# Patient Record
Sex: FEMALE | Race: OTHER | Employment: FULL TIME | URBAN - METROPOLITAN AREA
[De-identification: names, ages, dates, MRNs, and addresses within clinical notes are randomized per-mention and may not be internally consistent; named-entity substitution may affect disease eponyms.]

---

## 2017-03-30 ENCOUNTER — HOSPITAL ENCOUNTER (OUTPATIENT)
Dept: ULTRASOUND IMAGING | Facility: HOSPITAL | Age: 57
Discharge: HOME | End: 2017-03-30
Attending: OTOLARYNGOLOGY
Payer: COMMERCIAL

## 2017-03-30 DIAGNOSIS — E04.1 THYROID NODULE: ICD-10-CM

## 2017-03-30 PROCEDURE — 76536 US EXAM OF HEAD AND NECK: CPT

## 2017-04-03 ENCOUNTER — TELEPHONE (OUTPATIENT)
Dept: OTOLARYNGOLOGY | Facility: CLINIC | Age: 57
End: 2017-04-03

## 2017-04-03 NOTE — TELEPHONE ENCOUNTER
Notes Recorded by Flex Parker RN on 4/3/2017 at 3:35 PM  [address] , [place] [id number]    Pt informed of results and recommendation to RTC in 6 mos.  Pt verbalized understanding and will c/b to schedule appt in 6 mos.

## 2017-04-09 ENCOUNTER — HOSPITAL ENCOUNTER (OUTPATIENT)
Dept: MAMMOGRAPHY | Facility: HOSPITAL | Age: 57
Discharge: HOME | End: 2017-04-09
Attending: OBSTETRICS & GYNECOLOGY
Payer: COMMERCIAL

## 2017-04-09 DIAGNOSIS — Z12.31 ENCOUNTER FOR SCREENING MAMMOGRAM FOR BREAST CANCER: ICD-10-CM

## 2017-04-09 PROCEDURE — 77067 SCR MAMMO BI INCL CAD: CPT

## 2017-05-04 ENCOUNTER — TELEPHONE (OUTPATIENT)
Dept: OTOLARYNGOLOGY | Facility: CLINIC | Age: 57
End: 2017-05-04

## 2017-05-04 NOTE — TELEPHONE ENCOUNTER
Notes Recorded by Kimberly Rios RN on 4/3/2017 at 3:35 PM  [address] , [place] [id number]    Pt informed of results and recommendation to RTC in 6 mos.  Pt verbalized understanding and will c/b to schedule appt in 6 mos.   Notes Recorded

## 2017-10-13 ENCOUNTER — TELEPHONE (OUTPATIENT)
Dept: OTOLARYNGOLOGY | Facility: CLINIC | Age: 57
End: 2017-10-13

## 2017-10-13 NOTE — TELEPHONE ENCOUNTER
----- Message from Genie Barone RN sent at 5/4/2017  4:10 PM CDT -----  Regarding: F/U appt per JDO  Notes Recorded by Fatou Hikcs RN on 4/3/2017 at 3:35 PM  [address] , [place] [id number]    Pt informed of results and recommendation

## 2017-10-26 ENCOUNTER — OFFICE VISIT (OUTPATIENT)
Dept: OTOLARYNGOLOGY | Facility: CLINIC | Age: 57
End: 2017-10-26

## 2017-10-26 VITALS
HEART RATE: 60 BPM | SYSTOLIC BLOOD PRESSURE: 120 MMHG | DIASTOLIC BLOOD PRESSURE: 74 MMHG | BODY MASS INDEX: 24.99 KG/M2 | HEIGHT: 65 IN | WEIGHT: 150 LBS

## 2017-10-26 DIAGNOSIS — R09.82 POSTNASAL DISCHARGE: ICD-10-CM

## 2017-10-26 DIAGNOSIS — E04.1 THYROID NODULE: Primary | ICD-10-CM

## 2017-10-26 PROCEDURE — 99214 OFFICE O/P EST MOD 30 MIN: CPT | Performed by: OTOLARYNGOLOGY

## 2017-10-26 PROCEDURE — 99212 OFFICE O/P EST SF 10 MIN: CPT | Performed by: OTOLARYNGOLOGY

## 2017-10-27 NOTE — PROGRESS NOTES
Demond Lea is a 61year old female. Patient presents with: Follow - Up: Here today for Follow Up on Thyroid US Results       HISTORY OF PRESENT ILLNESS  Previous thyroidectomy.  Recent US unremarkable except for a small cyst. She presents with complaint Heart Disease Father      CAD   • Hypertension Other 79       Past Medical History:   Diagnosis Date   • Cyst, dermoid, arm 2003    per Next[employer]: removed   • H/O breast augmentation     per [employer]:    • Lipid screening 7/27/2013    per NG       Past Surgic Normal.   Ears Normal Inspection - Right: Normal, Left: Normal. Canal - Right: Normal, Left: Normal. TM - Right: Normal, Left: Normal.   Skin Normal Inspection - Normal.        Lymph Detail Normal Submental. Submandibular.  Anterior cervical. Posterior cerv

## 2018-01-25 ENCOUNTER — OFFICE VISIT (OUTPATIENT)
Dept: OBGYN CLINIC | Facility: CLINIC | Age: 58
End: 2018-01-25

## 2018-01-25 VITALS
HEIGHT: 66 IN | SYSTOLIC BLOOD PRESSURE: 128 MMHG | HEART RATE: 67 BPM | DIASTOLIC BLOOD PRESSURE: 82 MMHG | BODY MASS INDEX: 24.85 KG/M2 | WEIGHT: 154.63 LBS

## 2018-01-25 DIAGNOSIS — Z12.31 ENCOUNTER FOR SCREENING MAMMOGRAM FOR BREAST CANCER: ICD-10-CM

## 2018-01-25 DIAGNOSIS — Z01.419 ENCOUNTER FOR WELL WOMAN EXAM WITH ROUTINE GYNECOLOGICAL EXAM: Primary | ICD-10-CM

## 2018-01-25 PROCEDURE — 99396 PREV VISIT EST AGE 40-64: CPT | Performed by: OBSTETRICS & GYNECOLOGY

## 2018-01-25 NOTE — PROGRESS NOTES
HPI:    Patient ID: Heath Mukherjee is a 61year old female. HPI  Well woman visit  No complaints. Sexually active. Normal urination and bowel movements. Had previous colonoscopy which is normal.  Has breast implants.   Has always had some lateral right behavior is normal. Judgment normal.  Able to communicate verbally. HEENT:  EOMI. ELLIE. Sclera anicteric. Head: Normocephalic. Normal hair distribution. No lesions. Neck: Normal range of motion.       Adenopathy:  No supraclavicular or cervical diagnosis)  Encounter for screening mammogram for breast cancer  Normal exam.   Monthly self breast exam.  Annual screening mammograms. Recommend screening colonoscopy at age 47, or as advised by GI.   Recommend dexascan for osteoporosis screening as indic

## 2018-01-26 ENCOUNTER — TELEPHONE (OUTPATIENT)
Dept: OBGYN CLINIC | Facility: CLINIC | Age: 58
End: 2018-01-26

## 2018-01-26 NOTE — TELEPHONE ENCOUNTER
Pt states has been having pain in her uterus, rates it from 4-5 today. Pt states yesterday was 10. Has been taking advil for pain. Pt had a annual yesterday with Dr. Gonzales Hughes would like to know why pelvic exam caused so much pain. Pls advise.

## 2018-01-26 NOTE — TELEPHONE ENCOUNTER
Pt had annual yesterday with Ti, states that she is having uterus pain. Unable to sleep from pain. Took tynol extra strength, worked for a bit. Went to work and took advil.

## 2018-01-27 NOTE — TELEPHONE ENCOUNTER
fyi  Per pt after her pelvic exam with AJb she began experiencing lower abdominal pain, more on her left. Worse when bladder is full rated 4-7/10. Feels like ovary pain per pt.  Better with rest.  Finds it odd since she has never had this pain with pelvic

## 2018-01-29 ENCOUNTER — APPOINTMENT (OUTPATIENT)
Dept: GENERAL RADIOLOGY | Age: 58
End: 2018-01-29
Attending: FAMILY MEDICINE
Payer: COMMERCIAL

## 2018-01-29 ENCOUNTER — HOSPITAL ENCOUNTER (OUTPATIENT)
Age: 58
Discharge: HOME | End: 2018-01-29
Attending: FAMILY MEDICINE
Payer: COMMERCIAL

## 2018-01-29 VITALS
BODY MASS INDEX: 30 KG/M2 | SYSTOLIC BLOOD PRESSURE: 128 MMHG | HEART RATE: 72 BPM | DIASTOLIC BLOOD PRESSURE: 90 MMHG | WEIGHT: 184 LBS | RESPIRATION RATE: 20 BRPM | OXYGEN SATURATION: 97 % | TEMPERATURE: 99 F

## 2018-01-29 DIAGNOSIS — J40 BRONCHITIS: Primary | ICD-10-CM

## 2018-01-29 PROCEDURE — 99213 OFFICE O/P EST LOW 20 MIN: CPT

## 2018-01-29 PROCEDURE — 71046 X-RAY EXAM CHEST 2 VIEWS: CPT | Performed by: FAMILY MEDICINE

## 2018-01-29 PROCEDURE — 99204 OFFICE O/P NEW MOD 45 MIN: CPT

## 2018-01-29 RX ORDER — ALBUTEROL SULFATE 90 UG/1
2 AEROSOL, METERED RESPIRATORY (INHALATION) EVERY 4 HOURS PRN
Qty: 1 INHALER | Refills: 0 | Status: SHIPPED | OUTPATIENT
Start: 2018-01-29 | End: 2018-02-28

## 2018-01-29 NOTE — ED NOTES
Patient declines EKG for chest pain. Patient states she thinks this is respiratory and not cardiac. Pt states she has had many cardiac tests with normal findings.

## 2018-01-29 NOTE — ED PROVIDER NOTES
Patient Seen in: [facility] In [address] [place] [address]    History   Patient presents with:  Cough/URI    Stated Complaint: coughing spasms    HPI    Pt is a 60 yo with a 3 day h/o nasal congestion and cough. No fevers, no wheezing.  Occasionally sh normal and breath sounds normal. No respiratory distress. She has no wheezes. Neurological: She is alert and oriented to person, place, and time. Skin: Skin is warm. Psychiatric: She has a normal mood and affect.  Her behavior is normal.   Nursing not

## 2018-01-30 ENCOUNTER — OFFICE VISIT (OUTPATIENT)
Dept: OBGYN CLINIC | Facility: CLINIC | Age: 58
End: 2018-01-30

## 2018-01-30 VITALS — HEART RATE: 71 BPM | DIASTOLIC BLOOD PRESSURE: 73 MMHG | SYSTOLIC BLOOD PRESSURE: 120 MMHG

## 2018-01-30 DIAGNOSIS — R10.2 VAGINAL PAIN: Primary | ICD-10-CM

## 2018-01-30 PROCEDURE — 99212 OFFICE O/P EST SF 10 MIN: CPT | Performed by: OBSTETRICS & GYNECOLOGY

## 2018-02-01 PROBLEM — R10.2 VAGINAL PAIN: Status: ACTIVE | Noted: 2018-02-01

## 2018-02-01 NOTE — PROGRESS NOTES
HPI:    Patient ID: Anna Graves is a 61year old female. HPI  GYN problem visit  Complains of having vaginal pain and discomfort after her recent pelvic examination. Says that it is essentially gone now. She denies any vaginal discharge.   Full urine

## 2018-02-08 ENCOUNTER — OFFICE VISIT (OUTPATIENT)
Dept: OTOLARYNGOLOGY | Facility: CLINIC | Age: 58
End: 2018-02-08

## 2018-02-08 VITALS
SYSTOLIC BLOOD PRESSURE: 112 MMHG | DIASTOLIC BLOOD PRESSURE: 70 MMHG | WEIGHT: 148 LBS | HEIGHT: 66 IN | BODY MASS INDEX: 23.78 KG/M2 | TEMPERATURE: 98 F

## 2018-02-08 DIAGNOSIS — E04.1 THYROID NODULE: ICD-10-CM

## 2018-02-08 DIAGNOSIS — R07.0 THROAT DISCOMFORT: Primary | ICD-10-CM

## 2018-02-08 PROCEDURE — 99214 OFFICE O/P EST MOD 30 MIN: CPT | Performed by: OTOLARYNGOLOGY

## 2018-02-08 PROCEDURE — 99212 OFFICE O/P EST SF 10 MIN: CPT | Performed by: OTOLARYNGOLOGY

## 2018-02-08 RX ORDER — OMEPRAZOLE 40 MG/1
40 CAPSULE, DELAYED RELEASE ORAL DAILY
Qty: 30 CAPSULE | Refills: 2 | Status: SHIPPED | OUTPATIENT
Start: 2018-02-08 | End: 2021-02-13 | Stop reason: ALTCHOICE

## 2018-02-08 RX ORDER — LORATADINE 10 MG/1
10 TABLET ORAL DAILY
Qty: 30 TABLET | Refills: 3 | Status: SHIPPED | OUTPATIENT
Start: 2018-02-08 | End: 2020-07-06

## 2018-02-08 RX ORDER — MONTELUKAST SODIUM 10 MG/1
10 TABLET ORAL NIGHTLY
Qty: 30 TABLET | Refills: 3 | Status: SHIPPED | OUTPATIENT
Start: 2018-02-08 | End: 2020-07-06

## 2018-02-08 RX ORDER — FLUTICASONE PROPIONATE 50 MCG
1 SPRAY, SUSPENSION (ML) NASAL 2 TIMES DAILY
Qty: 1 BOTTLE | Refills: 3 | Status: SHIPPED | OUTPATIENT
Start: 2018-02-08 | End: 2020-07-06

## 2018-02-08 NOTE — PROGRESS NOTES
Madhav Cadet is a 61year old female. Patient presents with: Follow - Up: thyroid nodule- LOV 10/26/17      HISTORY OF PRESENT ILLNESS  Previous thyroidectomy. Recent US unremarkable except for a small cyst. She presents with complaint of halitosis.  Prev education:                 Number of children:               Social History Main Topics    Smoking status: Never Smoker                                                                Smokeless tobacco: Never Used                        Alcohol use:  Yes Normal. Palpation - Normal. Parotid gland - Normal. Thyroid gland - Normal.   Eyes Normal Conjunctiva - Right: Normal, Left: Normal. Pupil - Right: Normal, Left: Normal. Fundus - Right: Normal, Left: Normal.   Neurological Normal Memory - Normal. Cranial n month.    2. Thyroid nodule  No new palpable nodule noted on the remaining right hemithyroid. No need for ultrasound at this time. Routine thyroid screenings on exam.            Cassie Lovelace MD    2/8/2018    4:19 PM

## 2018-03-05 ENCOUNTER — HOSPITAL ENCOUNTER (OUTPATIENT)
Dept: MAMMOGRAPHY | Facility: HOSPITAL | Age: 58
Discharge: HOME | End: 2018-03-05
Attending: OBSTETRICS & GYNECOLOGY
Payer: COMMERCIAL

## 2018-03-05 DIAGNOSIS — Z12.31 ENCOUNTER FOR SCREENING MAMMOGRAM FOR BREAST CANCER: ICD-10-CM

## 2018-03-05 PROCEDURE — 77067 SCR MAMMO BI INCL CAD: CPT | Performed by: OBSTETRICS & GYNECOLOGY

## 2018-03-08 ENCOUNTER — TELEPHONE (OUTPATIENT)
Dept: OBGYN CLINIC | Facility: CLINIC | Age: 58
End: 2018-03-08

## 2018-03-08 NOTE — TELEPHONE ENCOUNTER
Phone line  #[phone] used to translate phone call. Pt informed of Dr. Vences Siemens message below.  Pt voices understanding and voices she will call centralized scheduling to schedule additional views and U/S of left breast.

## 2018-03-08 NOTE — TELEPHONE ENCOUNTER
----- Message from Christina Mansfield MD sent at 3/6/2018  4:28 AM CST -----  Please inform that her mammogram is incomplete and needs additional views of left breast and left breast ultrasound.

## 2018-03-09 ENCOUNTER — TELEPHONE (OUTPATIENT)
Dept: OBGYN CLINIC | Facility: CLINIC | Age: 58
End: 2018-03-09

## 2018-03-09 NOTE — TELEPHONE ENCOUNTER
Pt asking if additional US/ mammogram should be for left? States having some discomfort on R breast rather than L. Pt also stated she is still having vaginal pain, asking for US order. Kazakh speaking. pls adv.

## 2018-03-10 NOTE — TELEPHONE ENCOUNTER
Per pt she after annual exam at the end of January she began feeling uncomfortable feeling in pelvic area near ovaries. Pain is from 5-8/10. Currently a 6. Worse when bladder is full. Denied urgency, frequency, or burning/ painful urination.   Stated it fee

## 2018-03-13 ENCOUNTER — HOSPITAL ENCOUNTER (OUTPATIENT)
Dept: MAMMOGRAPHY | Facility: HOSPITAL | Age: 58
Discharge: HOME | End: 2018-03-13
Attending: OBSTETRICS & GYNECOLOGY
Payer: COMMERCIAL

## 2018-03-13 DIAGNOSIS — R92.8 ABNORMAL MAMMOGRAM: ICD-10-CM

## 2018-03-13 PROCEDURE — 77061 BREAST TOMOSYNTHESIS UNI: CPT | Performed by: OBSTETRICS & GYNECOLOGY

## 2018-03-13 PROCEDURE — 77065 DX MAMMO INCL CAD UNI: CPT | Performed by: OBSTETRICS & GYNECOLOGY

## 2018-04-28 ENCOUNTER — APPOINTMENT (OUTPATIENT)
Dept: GENERAL RADIOLOGY | Facility: HOSPITAL | Age: 58
End: 2018-04-28
Attending: EMERGENCY MEDICINE
Payer: COMMERCIAL

## 2018-04-28 ENCOUNTER — HOSPITAL ENCOUNTER (EMERGENCY)
Facility: HOSPITAL | Age: 58
Discharge: HOME | End: 2018-04-28
Attending: EMERGENCY MEDICINE
Payer: COMMERCIAL

## 2018-04-28 VITALS
DIASTOLIC BLOOD PRESSURE: 67 MMHG | OXYGEN SATURATION: 99 % | TEMPERATURE: 99 F | HEART RATE: 68 BPM | RESPIRATION RATE: 20 BRPM | SYSTOLIC BLOOD PRESSURE: 140 MMHG

## 2018-04-28 DIAGNOSIS — S52.125A CLOSED NONDISPLACED FRACTURE OF HEAD OF LEFT RADIUS, INITIAL ENCOUNTER: Primary | ICD-10-CM

## 2018-04-28 PROCEDURE — 99284 EMERGENCY DEPT VISIT MOD MDM: CPT

## 2018-04-28 PROCEDURE — 29105 APPLICATION LONG ARM SPLINT: CPT

## 2018-04-28 PROCEDURE — 73080 X-RAY EXAM OF ELBOW: CPT | Performed by: EMERGENCY MEDICINE

## 2018-04-29 NOTE — ED PROVIDER NOTES
Patient Seen in: [facility] Emergency Department    History   Patient presents with:  Upper Extremity Injury (musculoskeletal)      HPI    Patient presents to the ED complaining of left elbow pain after slipping her kitchen and falling onto her el ED Triage Vitals [04/28/18 1840]  BP: 140/67  Pulse: 68  Resp: 20  Temp: 99.3 °F (37.4 °C)  Temp src: Oral  SpO2: 99 %  O2 Device: None (Room air)    Physical Exam   Constitutional: She is oriented to person, place, and time.  She appears well-developed list. to contribute to the complexity of this ED evaluation. ED Course: Patient presents to the ED after a mechanical fall. Left elbow injury, no other complaints. Evidence for a radial head fracture on x-ray evaluation.   Patient placed in a long-arm

## 2018-12-18 ENCOUNTER — TELEPHONE (OUTPATIENT)
Dept: OTOLARYNGOLOGY | Facility: CLINIC | Age: 58
End: 2018-12-18

## 2018-12-18 NOTE — TELEPHONE ENCOUNTER
Pt. wants to know if  can send a new Rx for Calium 600mg + D 800 units Tablets. Pt. States that she takes 1 pill daily - 90 day supply.

## 2018-12-18 NOTE — TELEPHONE ENCOUNTER
Dr. Salgado Part please see note below , pt had left hemithyroidectomy in 2014. Pt last seen in 02/08/18 for throat discomfort and thyroid nodule follow up. Pt asking of calcium and vitamin D rx?   Please advise

## 2019-01-10 ENCOUNTER — OFFICE VISIT (OUTPATIENT)
Dept: OTOLARYNGOLOGY | Facility: CLINIC | Age: 59
End: 2019-01-10
Payer: COMMERCIAL

## 2019-01-10 VITALS
SYSTOLIC BLOOD PRESSURE: 123 MMHG | BODY MASS INDEX: 23.3 KG/M2 | DIASTOLIC BLOOD PRESSURE: 78 MMHG | HEIGHT: 66 IN | TEMPERATURE: 98 F | WEIGHT: 145 LBS

## 2019-01-10 DIAGNOSIS — E04.1 THYROID NODULE: Primary | ICD-10-CM

## 2019-01-10 DIAGNOSIS — Z00.00 PHYSICAL EXAM: ICD-10-CM

## 2019-01-10 PROCEDURE — 99212 OFFICE O/P EST SF 10 MIN: CPT | Performed by: OTOLARYNGOLOGY

## 2019-01-10 PROCEDURE — 99214 OFFICE O/P EST MOD 30 MIN: CPT | Performed by: OTOLARYNGOLOGY

## 2019-01-10 NOTE — PROGRESS NOTES
Sarah Ospina is a 60year old female. Patient presents with: Other: pt here for a check up on thyroid       HISTORY OF PRESENT ILLNESS  Previous thyroidectomy. Recent US unremarkable except for a small cyst. She presents with complaint of halitosis.  Prev months ago. no other significant complaints or concerns other than a sensation of phlegm in the back of her throat.       Social History    Socioeconomic History      Marital status:       Spouse name: Not on file      Number of children: Not on f °C) (Tympanic)   Ht 5' 6\" (1.676 m)   Wt 145 lb (65.8 kg)   BMI 23.40 kg/m²        Constitutional Normal Overall appearance - Normal.   Psychiatric Normal Orientation - Oriented to time, place, person & situation. Appropriate mood and affect.    Neck Exam US THYROID (JTM=[id number]); Future    2. Physical exam  I did refer her to Dr. Sindy García for routine medical care. We will go ahead and order ultrasound of thyroid to look for interval change in her 4 mm nodule.   I will call her with the results of her

## 2019-04-18 ENCOUNTER — TELEPHONE (OUTPATIENT)
Dept: OTOLARYNGOLOGY | Facility: CLINIC | Age: 59
End: 2019-04-18

## 2019-04-18 NOTE — TELEPHONE ENCOUNTER
Dr Mirna Soto, pt asking to speak directly to you, pt did not want to give anymore info, states it is regarding her throat.  Advised pt you are in clinic until 7

## 2019-04-24 ENCOUNTER — TELEPHONE (OUTPATIENT)
Dept: OTOLARYNGOLOGY | Facility: CLINIC | Age: 59
End: 2019-04-24

## 2019-04-24 NOTE — TELEPHONE ENCOUNTER
Pt is requesting to speak to [facility] on Thurs. 4/25/19, anytime after 3:00pm, regarding possible infection in throat area.

## 2019-04-24 NOTE — TELEPHONE ENCOUNTER
LMTCB- please advise RAJI is out of office today, will be in [address] [place] [address] but cannot advise when Jens Kirk will be able to call her

## 2019-04-27 NOTE — TELEPHONE ENCOUNTER
Please contact her and see what is bothering her . Perhaps this can be simply addressed by speaking with her on the phone.

## 2019-05-04 ENCOUNTER — OFFICE VISIT (OUTPATIENT)
Dept: OTOLARYNGOLOGY | Facility: CLINIC | Age: 59
End: 2019-05-04
Payer: COMMERCIAL

## 2019-05-04 VITALS — WEIGHT: 150 LBS | BODY MASS INDEX: 24.11 KG/M2 | TEMPERATURE: 98 F | HEIGHT: 66 IN

## 2019-05-04 DIAGNOSIS — R19.6 HALITOSIS: Primary | ICD-10-CM

## 2019-05-04 PROCEDURE — 99212 OFFICE O/P EST SF 10 MIN: CPT | Performed by: OTOLARYNGOLOGY

## 2019-05-04 PROCEDURE — 99214 OFFICE O/P EST MOD 30 MIN: CPT | Performed by: OTOLARYNGOLOGY

## 2019-05-04 RX ORDER — MONTELUKAST SODIUM 10 MG/1
10 TABLET ORAL NIGHTLY
Qty: 30 TABLET | Refills: 3 | Status: SHIPPED | OUTPATIENT
Start: 2019-05-04 | End: 2020-07-06

## 2019-05-04 RX ORDER — AMOXICILLIN AND CLAVULANATE POTASSIUM 875; 125 MG/1; MG/1
1 TABLET, FILM COATED ORAL EVERY 12 HOURS
Qty: 20 TABLET | Refills: 0 | Status: SHIPPED | OUTPATIENT
Start: 2019-05-04 | End: 2020-07-06

## 2019-05-04 RX ORDER — OMEPRAZOLE 40 MG/1
40 CAPSULE, DELAYED RELEASE ORAL DAILY
Qty: 30 CAPSULE | Refills: 2 | Status: SHIPPED | OUTPATIENT
Start: 2019-05-04 | End: 2020-07-06

## 2019-05-04 RX ORDER — AZELASTINE 1 MG/ML
2 SPRAY, METERED NASAL 2 TIMES DAILY
Qty: 1 BOTTLE | Refills: 0 | Status: SHIPPED | OUTPATIENT
Start: 2019-05-04 | End: 2020-07-06

## 2019-05-04 NOTE — PROGRESS NOTES
Cesar Oconnell is a 60year old female. Patient presents with:  Throat Problem: Thyroid nodules f/u. Bad breath concerns      HISTORY OF PRESENT ILLNESS  Previous thyroidectomy.  Recent US unremarkable except for a small cyst. She presents with complaint of last scan about 8 months ago. no other significant complaints or concerns other than a sensation of phlegm in the back of her throat.     5/4/19 she presents today with complaints of halitosis.   I have treated her in the past with reflux medications and SYSTEMS    System Neg/Pos Details   Constitutional Negative Fatigue, fever and weight loss. ENMT Negative Drooling. Eyes Negative Blurred vision and vision changes. Respiratory Negative Dyspnea and wheezing.    Cardio Negative Chest pain, irregular he Medications:   •  Amoxicillin-Pot Clavulanate 875-125 MG Oral Tab, Take 1 tablet by mouth every 12 (twelve) hours. , Disp: 20 tablet, Rfl: 0  •  Montelukast Sodium 10 MG Oral Tab, Take 1 tablet (10 mg total) by mouth nightly., Disp: 30 tablet, Rfl: 3  •  Lo how she is doing. If no better I will give her the name of some other ENTs as they may be more amenable to forming tonsillectomy as she does not seem to fulfill any the indications for tonsillectomy from my standpoint. Camille Martin.  Remigio Salmon MD    5/4

## 2020-04-08 ENCOUNTER — TELEPHONE (OUTPATIENT)
Dept: OTOLARYNGOLOGY | Facility: CLINIC | Age: 60
End: 2020-04-08

## 2020-04-08 NOTE — TELEPHONE ENCOUNTER
Per pt requesting refill for vitamin D. Per pt urgently needs this, lost her prescription bottle.  Please advise

## 2020-04-08 NOTE — TELEPHONE ENCOUNTER
Advised pt that Wilson Dry does not prescribed Vitamin D, clarified that pt is not talking about claritin D. Pt states no it is vitamin D. Advised pt  will need to contact PCP.

## 2020-06-24 ENCOUNTER — TELEPHONE (OUTPATIENT)
Dept: GASTROENTEROLOGY | Facility: CLINIC | Age: 60
End: 2020-06-24

## 2020-06-24 NOTE — TELEPHONE ENCOUNTER
Forwarded to [facility]. Patient contacted. Meds/allergies reviewed.     Last Procedure, Date, LILIA Benitez 7/24/15  Last Diagnosis:  Found polyp  Recalled for (mth/yrs): 5 yrs  Sedation used previously:  IV sedation  Last Prep Used (if known):  unknow Other than chronic constipation, she has been asymptomatic from a   lower  gastrointestinal tract standpoint. She also has chronic   gastroesophageal  reflux, dyspepsia, and some ENT symptoms which may or may not be   GI  related.  Endoscopy is being perfor ulceration, erosion, stricture, ring or Bermudez's esophagus. The   Z-line was  sharp at 41 cm with a less than 1 cm in size sliding hiatal   hernia. The  stomach was entered and distended appropriately with insufflated   air.  The  mucosa of the stomach was Questionnaire Details   Referral Information   [facility] Time Out   Administrative Information   Coding Query   CE OB Hx Audit Trail   SmartForms     No SmartForms are associated with this patient.    Orders Placed      None   Medication Changes        None consists of three pieces of pink to white-tan soft tissue ranging from 0.2 to   0.4 cm in greatest dimension.  The specimen is submitted in total in cassette B.   (cp)       Kerry Ibarra M.D./[facility]               Signed:   Kerry Ibarra MD                  07/27/1

## 2020-06-25 RX ORDER — POLYETHYLENE GLYCOL 3350, SODIUM CHLORIDE, SODIUM BICARBONATE, POTASSIUM CHLORIDE 420; 11.2; 5.72; 1.48 G/4L; G/4L; G/4L; G/4L
POWDER, FOR SOLUTION ORAL
Qty: 1 BOTTLE | Refills: 0 | Status: SHIPPED | OUTPATIENT
Start: 2020-06-25 | End: 2021-02-13 | Stop reason: ALTCHOICE

## 2020-06-25 NOTE — TELEPHONE ENCOUNTER
The patient's chart has been reviewed. Okay to schedule pt for 5 year CLN recall r/t hx colon polyps with Dr. Lucero Khalil.      Advise IV Twilight or MAC sedation with split dose Colyte/TriLyte or equivalent(eRx) preparation.   -Eligible for NE: Yes  -Bruce Damon

## 2020-06-26 NOTE — TELEPHONE ENCOUNTER
Spoke with pt today, she stated she has \"no time to get scheduled right now\". I advised her to call back when she is ready to get scheduled. Pt verbally understood. Please transfer to Shelia Garcia at ext [phone] or [phone] for scheduling.

## 2020-07-06 ENCOUNTER — LAB ENCOUNTER (OUTPATIENT)
Dept: LAB | Age: 60
End: 2020-07-06
Attending: FAMILY MEDICINE
Payer: COMMERCIAL

## 2020-07-06 ENCOUNTER — OFFICE VISIT (OUTPATIENT)
Dept: FAMILY MEDICINE CLINIC | Facility: CLINIC | Age: 60
End: 2020-07-06
Payer: COMMERCIAL

## 2020-07-06 VITALS
HEIGHT: 66 IN | TEMPERATURE: 98 F | BODY MASS INDEX: 24.11 KG/M2 | SYSTOLIC BLOOD PRESSURE: 106 MMHG | HEART RATE: 60 BPM | WEIGHT: 150 LBS | DIASTOLIC BLOOD PRESSURE: 65 MMHG

## 2020-07-06 DIAGNOSIS — R82.90 FOUL SMELLING URINE: ICD-10-CM

## 2020-07-06 DIAGNOSIS — R19.6 HALITOSIS: ICD-10-CM

## 2020-07-06 DIAGNOSIS — Z76.89 ENCOUNTER TO ESTABLISH CARE: ICD-10-CM

## 2020-07-06 DIAGNOSIS — R82.998 FOAMY URINE: ICD-10-CM

## 2020-07-06 DIAGNOSIS — D12.6 TUBULAR ADENOMA OF COLON: ICD-10-CM

## 2020-07-06 DIAGNOSIS — Z76.89 ENCOUNTER TO ESTABLISH CARE: Primary | ICD-10-CM

## 2020-07-06 PROBLEM — R10.2 VAGINAL PAIN: Status: RESOLVED | Noted: 2018-02-01 | Resolved: 2020-07-06

## 2020-07-06 LAB
ALBUMIN SERPL-MCNC: 3.8 G/DL (ref 3.4–5)
ALBUMIN/GLOB SERPL: 1.5 {RATIO} (ref 1–2)
ALP LIVER SERPL-CCNC: 85 U/L (ref 46–118)
ALT SERPL-CCNC: 24 U/L (ref 13–56)
ANION GAP SERPL CALC-SCNC: 2 MMOL/L (ref 0–18)
APPEARANCE: CLEAR
AST SERPL-CCNC: 18 U/L (ref 15–37)
BASOPHILS # BLD AUTO: 0.05 X10(3) UL (ref 0–0.2)
BASOPHILS NFR BLD AUTO: 0.9 %
BILIRUB SERPL-MCNC: 0.4 MG/DL (ref 0.1–2)
BILIRUB UR QL: NEGATIVE
BILIRUBIN: NEGATIVE
BUN BLD-MCNC: 17 MG/DL (ref 7–18)
BUN/CREAT SERPL: 22.7 (ref 10–20)
CALCIUM BLD-MCNC: 9 MG/DL (ref 8.5–10.1)
CHLORIDE SERPL-SCNC: 105 MMOL/L (ref 98–112)
CLARITY UR: CLEAR
CO2 SERPL-SCNC: 32 MMOL/L (ref 21–32)
COLOR UR: YELLOW
CREAT BLD-MCNC: 0.75 MG/DL (ref 0.55–1.02)
DEPRECATED RDW RBC AUTO: 41 FL (ref 35.1–46.3)
EOSINOPHIL # BLD AUTO: 0.22 X10(3) UL (ref 0–0.7)
EOSINOPHIL NFR BLD AUTO: 3.9 %
ERYTHROCYTE [DISTWIDTH] IN BLOOD BY AUTOMATED COUNT: 13.3 % (ref 11–15)
EST. AVERAGE GLUCOSE BLD GHB EST-MCNC: 100 MG/DL (ref 68–126)
GLOBULIN PLAS-MCNC: 2.5 G/DL (ref 2.8–4.4)
GLUCOSE (URINE DIPSTICK): NEGATIVE MG/DL
GLUCOSE BLD-MCNC: 85 MG/DL (ref 70–99)
GLUCOSE UR-MCNC: NEGATIVE MG/DL
HBA1C MFR BLD HPLC: 5.1 % (ref ?–5.7)
HCT VFR BLD AUTO: 35 % (ref 35–48)
HGB BLD-MCNC: 11.9 G/DL (ref 12–16)
HGB UR QL STRIP.AUTO: NEGATIVE
IMM GRANULOCYTES # BLD AUTO: 0.01 X10(3) UL (ref 0–1)
IMM GRANULOCYTES NFR BLD: 0.2 %
KETONES (URINE DIPSTICK): NEGATIVE MG/DL
KETONES UR-MCNC: NEGATIVE MG/DL
LYMPHOCYTES # BLD AUTO: 1.95 X10(3) UL (ref 1–4)
LYMPHOCYTES NFR BLD AUTO: 34.3 %
M PROTEIN MFR SERPL ELPH: 6.3 G/DL (ref 6.4–8.2)
MCH RBC QN AUTO: 28.5 PG (ref 26–34)
MCHC RBC AUTO-ENTMCNC: 34 G/DL (ref 31–37)
MCV RBC AUTO: 83.7 FL (ref 80–100)
MONOCYTES # BLD AUTO: 0.38 X10(3) UL (ref 0.1–1)
MONOCYTES NFR BLD AUTO: 6.7 %
MULTISTIX LOT#: NORMAL NUMERIC
NEUTROPHILS # BLD AUTO: 3.08 X10 (3) UL (ref 1.5–7.7)
NEUTROPHILS # BLD AUTO: 3.08 X10(3) UL (ref 1.5–7.7)
NEUTROPHILS NFR BLD AUTO: 54 %
NITRITE UR QL STRIP.AUTO: NEGATIVE
NITRITE, URINE: NEGATIVE
OCCULT BLOOD: NEGATIVE
OSMOLALITY SERPL CALC.SUM OF ELEC: 289 MOSM/KG (ref 275–295)
PATIENT FASTING Y/N/NP: NO
PH UR: 7 [PH] (ref 5–8)
PH, URINE: 7 (ref 4.5–8)
PLATELET # BLD AUTO: 240 10(3)UL (ref 150–450)
POTASSIUM SERPL-SCNC: 4 MMOL/L (ref 3.5–5.1)
PROT UR-MCNC: NEGATIVE MG/DL
RBC # BLD AUTO: 4.18 X10(6)UL (ref 3.8–5.3)
RBC #/AREA URNS AUTO: 1 /HPF
SODIUM SERPL-SCNC: 139 MMOL/L (ref 136–145)
SP GR UR STRIP: 1.01 (ref 1–1.03)
SPECIFIC GRAVITY: 1.02 (ref 1–1.03)
URINE-COLOR: YELLOW
UROBILINOGEN UR STRIP-ACNC: <2
UROBILINOGEN,SEMI-QN: 0.2 MG/DL (ref 0–1.9)
WBC # BLD AUTO: 5.7 X10(3) UL (ref 4–11)
WBC #/AREA URNS AUTO: 27 /HPF

## 2020-07-06 PROCEDURE — 3008F BODY MASS INDEX DOCD: CPT | Performed by: FAMILY MEDICINE

## 2020-07-06 PROCEDURE — 99213 OFFICE O/P EST LOW 20 MIN: CPT | Performed by: FAMILY MEDICINE

## 2020-07-06 PROCEDURE — 3078F DIAST BP <80 MM HG: CPT | Performed by: FAMILY MEDICINE

## 2020-07-06 PROCEDURE — 80053 COMPREHEN METABOLIC PANEL: CPT

## 2020-07-06 PROCEDURE — 81002 URINALYSIS NONAUTO W/O SCOPE: CPT | Performed by: FAMILY MEDICINE

## 2020-07-06 PROCEDURE — 87086 URINE CULTURE/COLONY COUNT: CPT

## 2020-07-06 PROCEDURE — 3074F SYST BP LT 130 MM HG: CPT | Performed by: FAMILY MEDICINE

## 2020-07-06 PROCEDURE — 36415 COLL VENOUS BLD VENIPUNCTURE: CPT

## 2020-07-06 PROCEDURE — 99204 OFFICE O/P NEW MOD 45 MIN: CPT | Performed by: FAMILY MEDICINE

## 2020-07-06 PROCEDURE — 81001 URINALYSIS AUTO W/SCOPE: CPT

## 2020-07-06 PROCEDURE — 85025 COMPLETE CBC W/AUTO DIFF WBC: CPT

## 2020-07-06 PROCEDURE — 83036 HEMOGLOBIN GLYCOSYLATED A1C: CPT

## 2020-07-06 NOTE — PROGRESS NOTES
HPI:   Sarah Ospina is a 60year old female who presents for an establish care visit as well as to discuss various complaints. She reports having a history of foamy, foul-smelling urine for the past several months.   Has not tried any over-the-counter prod scanned to media tab  1960   • HYSTERECTOMY      per [employer]:    • THYROIDECTOMY Left 2014    per [employer]: left hemithyroidectomy      Family History   Problem Relation Age of Onset   • Hypertension Father    • Heart Disease Father HEMOGLOBIN A1C; Future    4. Foul smelling urine  - URINALYSIS, ROUTINE; Future  - URINE CULTURE, ROUTINE; Future    5.  Halitosis  - checking labs including liver function test.  Advised patient to start taking allergy pills and antiacids that she has prev

## 2020-07-08 RX ORDER — SULFAMETHOXAZOLE AND TRIMETHOPRIM 800; 160 MG/1; MG/1
1 TABLET ORAL 2 TIMES DAILY
Qty: 6 TABLET | Refills: 0 | Status: SHIPPED | OUTPATIENT
Start: 2020-07-08 | End: 2020-07-11

## 2020-07-30 NOTE — TELEPHONE ENCOUNTER
Left message to call back and schedule. If patient calls back please forward call to GI Schedulers. 2nd attempt to schedule patient with no success. TE Closed.

## 2020-08-27 ENCOUNTER — TELEPHONE (OUTPATIENT)
Dept: FAMILY MEDICINE CLINIC | Facility: CLINIC | Age: 60
End: 2020-08-27

## 2020-08-27 NOTE — TELEPHONE ENCOUNTER
Patient reports missed call from office few weeks ago, is not sure who called or why. Pt was advised may have been GI trying to reach for appt, pt will return call to GI.  No current notes reporting a msg left for patient with our office. (see GI TE 6/24/20

## 2020-10-15 ENCOUNTER — OFFICE VISIT (OUTPATIENT)
Dept: OCCUPATIONAL MEDICINE | Age: 60
End: 2020-10-15
Attending: FAMILY MEDICINE

## 2020-10-15 ENCOUNTER — HOSPITAL ENCOUNTER (OUTPATIENT)
Dept: GENERAL RADIOLOGY | Age: 60
Discharge: HOME | End: 2020-10-15
Attending: FAMILY MEDICINE

## 2020-10-15 DIAGNOSIS — S49.91XA RIGHT SHOULDER INJURY, INITIAL ENCOUNTER: ICD-10-CM

## 2020-10-15 DIAGNOSIS — S49.91XA RIGHT SHOULDER INJURY, INITIAL ENCOUNTER: Primary | ICD-10-CM

## 2020-10-15 PROCEDURE — 73030 X-RAY EXAM OF SHOULDER: CPT | Performed by: FAMILY MEDICINE

## 2020-10-20 ENCOUNTER — OFFICE VISIT (OUTPATIENT)
Dept: OCCUPATIONAL MEDICINE | Age: 60
End: 2020-10-20
Attending: FAMILY MEDICINE

## 2020-10-20 DIAGNOSIS — S49.91XA INJURY OF RIGHT SHOULDER, INITIAL ENCOUNTER: Primary | ICD-10-CM

## 2020-11-04 ENCOUNTER — HOSPITAL ENCOUNTER (OUTPATIENT)
Dept: MRI IMAGING | Age: 60
Discharge: HOME | End: 2020-11-04
Attending: FAMILY MEDICINE
Payer: OTHER MISCELLANEOUS

## 2020-11-04 DIAGNOSIS — S49.91XA INJURY OF RIGHT SHOULDER, INITIAL ENCOUNTER: ICD-10-CM

## 2020-11-04 PROCEDURE — 73221 MRI JOINT UPR EXTREM W/O DYE: CPT | Performed by: FAMILY MEDICINE

## 2021-01-14 ENCOUNTER — LAB ENCOUNTER (OUTPATIENT)
Dept: LAB | Facility: HOSPITAL | Age: 61
End: 2021-01-14
Attending: ORTHOPAEDIC SURGERY
Payer: OTHER MISCELLANEOUS

## 2021-01-14 ENCOUNTER — HOSPITAL ENCOUNTER (OUTPATIENT)
Dept: GENERAL RADIOLOGY | Facility: HOSPITAL | Age: 61
Discharge: HOME | End: 2021-01-14
Attending: ORTHOPAEDIC SURGERY
Payer: OTHER MISCELLANEOUS

## 2021-01-14 DIAGNOSIS — Z01.818 PRE-OP EXAM: Primary | ICD-10-CM

## 2021-01-14 DIAGNOSIS — Z01.818 PRE-OP TESTING: ICD-10-CM

## 2021-01-14 LAB
ANION GAP SERPL CALC-SCNC: 2 MMOL/L (ref 0–18)
APTT PPP: 31.2 SECONDS (ref 23.2–35.3)
BASOPHILS # BLD AUTO: 0.06 X10(3) UL (ref 0–0.2)
BASOPHILS NFR BLD AUTO: 1.2 %
BUN BLD-MCNC: 17 MG/DL (ref 7–18)
BUN/CREAT SERPL: 18.3 (ref 10–20)
CALCIUM BLD-MCNC: 9.4 MG/DL (ref 8.5–10.1)
CHLORIDE SERPL-SCNC: 109 MMOL/L (ref 98–112)
CO2 SERPL-SCNC: 29 MMOL/L (ref 21–32)
CREAT BLD-MCNC: 0.93 MG/DL
DEPRECATED RDW RBC AUTO: 40.4 FL (ref 35.1–46.3)
EOSINOPHIL # BLD AUTO: 0.23 X10(3) UL (ref 0–0.7)
EOSINOPHIL NFR BLD AUTO: 4.6 %
ERYTHROCYTE [DISTWIDTH] IN BLOOD BY AUTOMATED COUNT: 13.7 % (ref 11–15)
GLUCOSE BLD-MCNC: 97 MG/DL (ref 70–99)
HCT VFR BLD AUTO: 38.1 %
HGB BLD-MCNC: 12.6 G/DL
IMM GRANULOCYTES # BLD AUTO: 0.01 X10(3) UL (ref 0–1)
IMM GRANULOCYTES NFR BLD: 0.2 %
INR BLD: 1.03 (ref 0.9–1.2)
LYMPHOCYTES # BLD AUTO: 1.5 X10(3) UL (ref 1–4)
LYMPHOCYTES NFR BLD AUTO: 30 %
MCH RBC QN AUTO: 27.2 PG (ref 26–34)
MCHC RBC AUTO-ENTMCNC: 33.1 G/DL (ref 31–37)
MCV RBC AUTO: 82.1 FL
MONOCYTES # BLD AUTO: 0.39 X10(3) UL (ref 0.1–1)
MONOCYTES NFR BLD AUTO: 7.8 %
NEUTROPHILS # BLD AUTO: 2.81 X10 (3) UL (ref 1.5–7.7)
NEUTROPHILS # BLD AUTO: 2.81 X10(3) UL (ref 1.5–7.7)
NEUTROPHILS NFR BLD AUTO: 56.2 %
OSMOLALITY SERPL CALC.SUM OF ELEC: 291 MOSM/KG (ref 275–295)
PATIENT FASTING Y/N/NP: NO
PLATELET # BLD AUTO: 279 10(3)UL (ref 150–450)
POTASSIUM SERPL-SCNC: 3.9 MMOL/L (ref 3.5–5.1)
PROTHROMBIN TIME: 13.3 SECONDS (ref 11.8–14.5)
RBC # BLD AUTO: 4.64 X10(6)UL
SODIUM SERPL-SCNC: 140 MMOL/L (ref 136–145)
WBC # BLD AUTO: 5 X10(3) UL (ref 4–11)

## 2021-01-14 PROCEDURE — 85025 COMPLETE CBC W/AUTO DIFF WBC: CPT

## 2021-01-14 PROCEDURE — 93005 ELECTROCARDIOGRAM TRACING: CPT

## 2021-01-14 PROCEDURE — 80048 BASIC METABOLIC PNL TOTAL CA: CPT

## 2021-01-14 PROCEDURE — 71046 X-RAY EXAM CHEST 2 VIEWS: CPT | Performed by: ORTHOPAEDIC SURGERY

## 2021-01-14 PROCEDURE — 93010 ELECTROCARDIOGRAM REPORT: CPT | Performed by: ORTHOPAEDIC SURGERY

## 2021-01-14 PROCEDURE — 85610 PROTHROMBIN TIME: CPT

## 2021-01-14 PROCEDURE — 36415 COLL VENOUS BLD VENIPUNCTURE: CPT

## 2021-01-14 PROCEDURE — 85730 THROMBOPLASTIN TIME PARTIAL: CPT

## 2021-02-13 ENCOUNTER — LAB ENCOUNTER (OUTPATIENT)
Dept: LAB | Age: 61
End: 2021-02-13
Attending: ORTHOPAEDIC SURGERY
Payer: COMMERCIAL

## 2021-02-13 DIAGNOSIS — Z01.818 PREOP TESTING: ICD-10-CM

## 2021-02-13 LAB — SARS-COV-2 RNA RESP QL NAA+PROBE: NOT DETECTED

## 2021-02-13 RX ORDER — BIOTIN 1 MG
1 TABLET ORAL DAILY
COMMUNITY

## 2021-02-13 RX ORDER — IBUPROFEN 800 MG/1
800 TABLET ORAL 3 TIMES DAILY PRN
COMMUNITY

## 2021-02-16 ENCOUNTER — ANESTHESIA (OUTPATIENT)
Dept: SURGERY | Facility: HOSPITAL | Age: 61
End: 2021-02-16
Payer: OTHER MISCELLANEOUS

## 2021-02-16 ENCOUNTER — ANESTHESIA EVENT (OUTPATIENT)
Dept: SURGERY | Facility: HOSPITAL | Age: 61
End: 2021-02-16
Payer: OTHER MISCELLANEOUS

## 2021-02-16 ENCOUNTER — HOSPITAL ENCOUNTER (OUTPATIENT)
Facility: HOSPITAL | Age: 61
Setting detail: HOSPITAL OUTPATIENT SURGERY
Discharge: HOME | End: 2021-02-16
Attending: ORTHOPAEDIC SURGERY | Admitting: ORTHOPAEDIC SURGERY
Payer: OTHER MISCELLANEOUS

## 2021-02-16 VITALS
TEMPERATURE: 97 F | RESPIRATION RATE: 18 BRPM | BODY MASS INDEX: 25.49 KG/M2 | WEIGHT: 153 LBS | HEART RATE: 60 BPM | OXYGEN SATURATION: 99 % | SYSTOLIC BLOOD PRESSURE: 122 MMHG | HEIGHT: 65 IN | DIASTOLIC BLOOD PRESSURE: 69 MMHG

## 2021-02-16 DIAGNOSIS — Z01.818 PREOP TESTING: Primary | ICD-10-CM

## 2021-02-16 PROBLEM — M75.101 ROTATOR CUFF TEAR, RIGHT: Status: ACTIVE | Noted: 2021-02-16

## 2021-02-16 PROCEDURE — 0RBJ4ZZ EXCISION OF RIGHT SHOULDER JOINT, PERCUTANEOUS ENDOSCOPIC APPROACH: ICD-10-PCS | Performed by: ORTHOPAEDIC SURGERY

## 2021-02-16 PROCEDURE — 64415 NJX AA&/STRD BRCH PLXS IMG: CPT | Performed by: ORTHOPAEDIC SURGERY

## 2021-02-16 PROCEDURE — 0LQ14ZZ REPAIR RIGHT SHOULDER TENDON, PERCUTANEOUS ENDOSCOPIC APPROACH: ICD-10-PCS | Performed by: ORTHOPAEDIC SURGERY

## 2021-02-16 PROCEDURE — 3E0T3BZ INTRODUCTION OF ANESTHETIC AGENT INTO PERIPHERAL NERVES AND PLEXI, PERCUTANEOUS APPROACH: ICD-10-PCS | Performed by: ANESTHESIOLOGY

## 2021-02-16 PROCEDURE — 76942 ECHO GUIDE FOR BIOPSY: CPT | Performed by: ANESTHESIOLOGY

## 2021-02-16 PROCEDURE — 76942 ECHO GUIDE FOR BIOPSY: CPT | Performed by: ORTHOPAEDIC SURGERY

## 2021-02-16 PROCEDURE — 3E0T33Z INTRODUCTION OF ANTI-INFLAMMATORY INTO PERIPHERAL NERVES AND PLEXI, PERCUTANEOUS APPROACH: ICD-10-PCS | Performed by: ANESTHESIOLOGY

## 2021-02-16 PROCEDURE — 0RNJ4ZZ RELEASE RIGHT SHOULDER JOINT, PERCUTANEOUS ENDOSCOPIC APPROACH: ICD-10-PCS | Performed by: ORTHOPAEDIC SURGERY

## 2021-02-16 DEVICE — ANCHOR CORK METAL AR-1928SF-45: Type: IMPLANTABLE DEVICE | Site: SHOULDER | Status: FUNCTIONAL

## 2021-02-16 RX ORDER — CEFAZOLIN SODIUM/WATER 2 G/20 ML
2 SYRINGE (ML) INTRAVENOUS ONCE
Status: DISCONTINUED | OUTPATIENT
Start: 2021-02-16 | End: 2021-02-16 | Stop reason: HOSPADM

## 2021-02-16 RX ORDER — ROCURONIUM BROMIDE 10 MG/ML
INJECTION, SOLUTION INTRAVENOUS AS NEEDED
Status: DISCONTINUED | OUTPATIENT
Start: 2021-02-16 | End: 2021-02-16 | Stop reason: SURG

## 2021-02-16 RX ORDER — HYDROMORPHONE HYDROCHLORIDE 1 MG/ML
0.6 INJECTION, SOLUTION INTRAMUSCULAR; INTRAVENOUS; SUBCUTANEOUS EVERY 5 MIN PRN
Status: DISCONTINUED | OUTPATIENT
Start: 2021-02-16 | End: 2021-02-16

## 2021-02-16 RX ORDER — MORPHINE SULFATE 4 MG/ML
4 INJECTION, SOLUTION INTRAMUSCULAR; INTRAVENOUS EVERY 10 MIN PRN
Status: DISCONTINUED | OUTPATIENT
Start: 2021-02-16 | End: 2021-02-16

## 2021-02-16 RX ORDER — HYDROMORPHONE HYDROCHLORIDE 1 MG/ML
0.4 INJECTION, SOLUTION INTRAMUSCULAR; INTRAVENOUS; SUBCUTANEOUS EVERY 5 MIN PRN
Status: DISCONTINUED | OUTPATIENT
Start: 2021-02-16 | End: 2021-02-16

## 2021-02-16 RX ORDER — CEFAZOLIN SODIUM/WATER 2 G/20 ML
SYRINGE (ML) INTRAVENOUS AS NEEDED
Status: DISCONTINUED | OUTPATIENT
Start: 2021-02-16 | End: 2021-02-16 | Stop reason: SURG

## 2021-02-16 RX ORDER — ROPIVACAINE HYDROCHLORIDE 5 MG/ML
INJECTION, SOLUTION EPIDURAL; INFILTRATION; PERINEURAL
Status: COMPLETED | OUTPATIENT
Start: 2021-02-16 | End: 2021-02-16

## 2021-02-16 RX ORDER — PROCHLORPERAZINE EDISYLATE 5 MG/ML
5 INJECTION INTRAMUSCULAR; INTRAVENOUS ONCE AS NEEDED
Status: DISCONTINUED | OUTPATIENT
Start: 2021-02-16 | End: 2021-02-16

## 2021-02-16 RX ORDER — SODIUM CHLORIDE, SODIUM LACTATE, POTASSIUM CHLORIDE, CALCIUM CHLORIDE 600; 310; 30; 20 MG/100ML; MG/100ML; MG/100ML; MG/100ML
INJECTION, SOLUTION INTRAVENOUS CONTINUOUS
Status: DISCONTINUED | OUTPATIENT
Start: 2021-02-16 | End: 2021-02-16

## 2021-02-16 RX ORDER — MAGNESIUM HYDROXIDE 1200 MG/15ML
LIQUID ORAL CONTINUOUS PRN
Status: COMPLETED | OUTPATIENT
Start: 2021-02-16 | End: 2021-02-16

## 2021-02-16 RX ORDER — HALOPERIDOL 5 MG/ML
0.25 INJECTION INTRAMUSCULAR ONCE AS NEEDED
Status: DISCONTINUED | OUTPATIENT
Start: 2021-02-16 | End: 2021-02-16

## 2021-02-16 RX ORDER — MIDAZOLAM HYDROCHLORIDE 1 MG/ML
INJECTION INTRAMUSCULAR; INTRAVENOUS
Status: COMPLETED | OUTPATIENT
Start: 2021-02-16 | End: 2021-02-16

## 2021-02-16 RX ORDER — NALOXONE HYDROCHLORIDE 0.4 MG/ML
80 INJECTION, SOLUTION INTRAMUSCULAR; INTRAVENOUS; SUBCUTANEOUS AS NEEDED
Status: DISCONTINUED | OUTPATIENT
Start: 2021-02-16 | End: 2021-02-16

## 2021-02-16 RX ORDER — MORPHINE SULFATE 4 MG/ML
2 INJECTION, SOLUTION INTRAMUSCULAR; INTRAVENOUS EVERY 10 MIN PRN
Status: DISCONTINUED | OUTPATIENT
Start: 2021-02-16 | End: 2021-02-16

## 2021-02-16 RX ORDER — HYDROMORPHONE HYDROCHLORIDE 1 MG/ML
0.2 INJECTION, SOLUTION INTRAMUSCULAR; INTRAVENOUS; SUBCUTANEOUS EVERY 5 MIN PRN
Status: DISCONTINUED | OUTPATIENT
Start: 2021-02-16 | End: 2021-02-16

## 2021-02-16 RX ORDER — ONDANSETRON 2 MG/ML
4 INJECTION INTRAMUSCULAR; INTRAVENOUS ONCE AS NEEDED
Status: DISCONTINUED | OUTPATIENT
Start: 2021-02-16 | End: 2021-02-16

## 2021-02-16 RX ORDER — ACETAMINOPHEN 500 MG
1000 TABLET ORAL ONCE
Status: COMPLETED | OUTPATIENT
Start: 2021-02-16 | End: 2021-02-16

## 2021-02-16 RX ORDER — ONDANSETRON 2 MG/ML
INJECTION INTRAMUSCULAR; INTRAVENOUS AS NEEDED
Status: DISCONTINUED | OUTPATIENT
Start: 2021-02-16 | End: 2021-02-16 | Stop reason: SURG

## 2021-02-16 RX ORDER — GLYCOPYRROLATE 0.2 MG/ML
INJECTION, SOLUTION INTRAMUSCULAR; INTRAVENOUS AS NEEDED
Status: DISCONTINUED | OUTPATIENT
Start: 2021-02-16 | End: 2021-02-16 | Stop reason: SURG

## 2021-02-16 RX ORDER — HYDROCODONE BITARTRATE AND ACETAMINOPHEN 5; 325 MG/1; MG/1
1 TABLET ORAL AS NEEDED
Status: COMPLETED | OUTPATIENT
Start: 2021-02-16 | End: 2021-02-16

## 2021-02-16 RX ORDER — HYDROCODONE BITARTRATE AND ACETAMINOPHEN 5; 325 MG/1; MG/1
2 TABLET ORAL AS NEEDED
Status: COMPLETED | OUTPATIENT
Start: 2021-02-16 | End: 2021-02-16

## 2021-02-16 RX ORDER — MORPHINE SULFATE 10 MG/ML
6 INJECTION, SOLUTION INTRAMUSCULAR; INTRAVENOUS EVERY 10 MIN PRN
Status: DISCONTINUED | OUTPATIENT
Start: 2021-02-16 | End: 2021-02-16

## 2021-02-16 RX ORDER — DEXAMETHASONE SODIUM PHOSPHATE 10 MG/ML
INJECTION, SOLUTION INTRAMUSCULAR; INTRAVENOUS
Status: COMPLETED | OUTPATIENT
Start: 2021-02-16 | End: 2021-02-16

## 2021-02-16 RX ORDER — LIDOCAINE HYDROCHLORIDE 10 MG/ML
INJECTION, SOLUTION EPIDURAL; INFILTRATION; INTRACAUDAL; PERINEURAL AS NEEDED
Status: DISCONTINUED | OUTPATIENT
Start: 2021-02-16 | End: 2021-02-16 | Stop reason: SURG

## 2021-02-16 RX ADMIN — MIDAZOLAM HYDROCHLORIDE 2 MG: 1 INJECTION INTRAMUSCULAR; INTRAVENOUS at 13:29:00

## 2021-02-16 RX ADMIN — SODIUM CHLORIDE, SODIUM LACTATE, POTASSIUM CHLORIDE, CALCIUM CHLORIDE: 600; 310; 30; 20 INJECTION, SOLUTION INTRAVENOUS at 13:37:00

## 2021-02-16 RX ADMIN — CEFAZOLIN SODIUM/WATER 2 G: 2 G/20 ML SYRINGE (ML) INTRAVENOUS at 13:50:00

## 2021-02-16 RX ADMIN — ONDANSETRON 4 MG: 2 INJECTION INTRAMUSCULAR; INTRAVENOUS at 13:52:00

## 2021-02-16 RX ADMIN — ROPIVACAINE HYDROCHLORIDE 30 ML: 5 INJECTION, SOLUTION EPIDURAL; INFILTRATION; PERINEURAL at 13:29:00

## 2021-02-16 RX ADMIN — SODIUM CHLORIDE, SODIUM LACTATE, POTASSIUM CHLORIDE, CALCIUM CHLORIDE: 600; 310; 30; 20 INJECTION, SOLUTION INTRAVENOUS at 14:42:00

## 2021-02-16 RX ADMIN — DEXAMETHASONE SODIUM PHOSPHATE 4 MG: 10 INJECTION, SOLUTION INTRAMUSCULAR; INTRAVENOUS at 13:29:00

## 2021-02-16 RX ADMIN — ROCURONIUM BROMIDE 5 MG: 10 INJECTION, SOLUTION INTRAVENOUS at 13:42:00

## 2021-02-16 RX ADMIN — LIDOCAINE HYDROCHLORIDE 25 MG: 10 INJECTION, SOLUTION EPIDURAL; INFILTRATION; INTRACAUDAL; PERINEURAL at 13:42:00

## 2021-02-16 RX ADMIN — GLYCOPYRROLATE 0.1 MG: 0.2 INJECTION, SOLUTION INTRAMUSCULAR; INTRAVENOUS at 13:42:00

## 2021-02-16 NOTE — ANESTHESIA PREPROCEDURE EVALUATION
Anesthesia PreOp Note    HPI:     Krystyna Crane is a 63year old female who presents for preoperative consultation requested by:  Otis Powell MD    Date of Surgery: 2/16/2021    Procedure(s):  SHOULDER ARTHROSCOPY ROTATOR CUFF REPAIR  Indication file.      No Known Allergies    Family History   Problem Relation Age of Onset   • Hypertension Father    • Heart Disease Father         CAD   • Hypertension Other 79   • Hypertension Sister      Social History    Socioeconomic History      Marital status Not Asked        Bike Helmet: Not Asked        Seat Belt: Not Asked        Self-Exams: Not Asked    Social History Narrative      Not on file      Available pre-op labs reviewed.   Lab Results   Component Value Date    WBC 5.0 01/14/2021    RBC 4.64 01/14/2 answered to the best of my ability. The patient desires the anesthetic management as planned.   Lenny Baer  2/16/2021 1:14 PM

## 2021-02-16 NOTE — H&P
[facility] Patient Status:  [facility] Outpatient Surgery    1960 MRN [id number]   Location [facility] Attending Sean Narvaez MD   Hosp Day # 0 PCP [facility] Meter 98%   BMI 25.46 kg/m²   Extremities:  No lower extremity edema noted. Without clubbing or cyanosis. R shoulder:  Positive Neer's. NVI distally.      Laboratory Data:         Impression and Plan:  Patient Active Problem List:     Halitosis     Goiter

## 2021-02-16 NOTE — ANESTHESIA PROCEDURE NOTES
Airway  Date/Time: 2/16/2021 1:44 PM  Urgency: Elective    Airway not difficult    General Information and Staff    Patient location during procedure: OR  Anesthesiologist: Rosa Jackson MD  Resident/CRNA: Faizan Ruiz CRNA  Performed: CRNA     In

## 2021-02-16 NOTE — ANESTHESIA POSTPROCEDURE EVALUATION
Patient: Laura Molina    Procedure Summary     Date: 02/16/21 Room / Location: [address] [place] [address] MAIN OR 05 / [address] [place] [address] MAIN OR    Anesthesia Start: 8266 Anesthesia Stop: 1680    Procedure: SHOULDER ARTHROSCOPY ROTATOR CUFF REPAIR (Right Shoulder) Diagnosis: (ri

## 2021-02-16 NOTE — ANESTHESIA PROCEDURE NOTES
Peripheral Block    Date/Time: 2/16/2021 1:29 PM  Performed by: Emily Linn MD  Authorized by: Emily Linn MD       General Information and Staff    Start Time:  2/16/2021 1:26 PM  End Time:  2/16/2021 1:29 PM  Anesthesiologist:  Emily Linn MD  P

## 2021-02-16 NOTE — BRIEF OP NOTE
Pre-Operative Diagnosis: Right shoulder rotator cuff tear     Post-Operative Diagnosis: Right shoulder full thickness rotator cuff tear supraspinatus tendon, secondary impingement, anterior labral tearing with no instability of the biceps anchor, and synov

## 2021-04-26 NOTE — OPERATIVE REPORT
[facility]    PATIENT'S NAME: Alex BONE   ATTENDING PHYSICIAN: [address] Soledad Alvarado MD   OPERATING PHYSICIAN: Young Black.  Soledad Alvarado MD   PATIENT ACCOUNT#:   [id number]    LOCATION:  [facility] 10  MEDICAL RECORD #:   [id number]       DATE preoperative holding area. Both the patient and I confirmed that the right shoulder was the operative site and the right shoulder was marked accordingly. IV antibiotics were administered to the patient for prophylactic purposes.   Interscalene block was o arthroscopic shaver, an extensive bursectomy was performed. We examined the rotator cuff. There was a full-thickness rotator cuff tear present. Next, we released the anterolateral aspect of the coracoacromial ligament.   Once this was completed, there

## 2021-06-01 ENCOUNTER — HOSPITAL ENCOUNTER (OUTPATIENT)
Dept: MRI IMAGING | Facility: HOSPITAL | Age: 61
Discharge: HOME | End: 2021-06-01
Attending: ORTHOPAEDIC SURGERY
Payer: OTHER MISCELLANEOUS

## 2021-06-01 DIAGNOSIS — M25.512 LEFT SHOULDER PAIN: ICD-10-CM

## 2021-06-01 PROCEDURE — 73221 MRI JOINT UPR EXTREM W/O DYE: CPT | Performed by: ORTHOPAEDIC SURGERY

## 2021-07-15 ENCOUNTER — OFFICE VISIT (OUTPATIENT)
Dept: OBGYN CLINIC | Facility: CLINIC | Age: 61
End: 2021-07-15
Payer: MEDICAID

## 2021-07-15 VITALS — WEIGHT: 160 LBS | BODY MASS INDEX: 27 KG/M2

## 2021-07-15 DIAGNOSIS — Z01.419 ENCOUNTER FOR WELL WOMAN EXAM WITH ROUTINE GYNECOLOGICAL EXAM: Primary | ICD-10-CM

## 2021-07-15 DIAGNOSIS — E55.9 VITAMIN D DEFICIENCY: ICD-10-CM

## 2021-07-15 DIAGNOSIS — Z12.31 ENCOUNTER FOR SCREENING MAMMOGRAM FOR BREAST CANCER: ICD-10-CM

## 2021-07-15 DIAGNOSIS — Z82.62 FAMILY HISTORY OF OSTEOPOROSIS: ICD-10-CM

## 2021-07-15 DIAGNOSIS — Z78.0 MENOPAUSE: ICD-10-CM

## 2021-07-15 PROCEDURE — 99386 PREV VISIT NEW AGE 40-64: CPT | Performed by: OBSTETRICS & GYNECOLOGY

## 2021-07-15 NOTE — PROGRESS NOTES
HPI:    Patient ID: Unique Monroe is a 63year old year old female. HPI  Well woman visit  Essential new patient seen over 3 to 4 years ago. No complaints. History of hysterectomy for menorrhagia in 2001.   Requests vitamin D as she had be Nontender, no masses. Perineum- normal without lesions  Anus-  Normal appearing without lesions. ASSESSMENT/PLAN:    (Z01.419) Encounter for well woman exam with routine gynecological exam  (primary encounter diagnosis)  Plan: Normal examination.   Mahad Quintana

## 2021-08-03 ENCOUNTER — HOSPITAL ENCOUNTER (OUTPATIENT)
Dept: MAMMOGRAPHY | Facility: HOSPITAL | Age: 61
Discharge: HOME | End: 2021-08-03
Attending: OBSTETRICS & GYNECOLOGY
Payer: MEDICAID

## 2021-08-03 DIAGNOSIS — Z12.31 ENCOUNTER FOR SCREENING MAMMOGRAM FOR BREAST CANCER: ICD-10-CM

## 2021-08-03 PROCEDURE — 77067 SCR MAMMO BI INCL CAD: CPT | Performed by: OBSTETRICS & GYNECOLOGY

## 2021-08-03 PROCEDURE — 77063 BREAST TOMOSYNTHESIS BI: CPT | Performed by: OBSTETRICS & GYNECOLOGY

## 2021-08-10 ENCOUNTER — HOSPITAL ENCOUNTER (OUTPATIENT)
Dept: BONE DENSITY | Age: 61
Discharge: HOME | End: 2021-08-10
Attending: OBSTETRICS & GYNECOLOGY
Payer: MEDICAID

## 2021-08-10 DIAGNOSIS — Z82.62 FAMILY HISTORY OF OSTEOPOROSIS: ICD-10-CM

## 2021-08-10 DIAGNOSIS — Z78.0 MENOPAUSE: ICD-10-CM

## 2021-08-10 PROCEDURE — 77080 DXA BONE DENSITY AXIAL: CPT | Performed by: OBSTETRICS & GYNECOLOGY

## 2021-08-11 ENCOUNTER — TELEPHONE (OUTPATIENT)
Dept: OBGYN CLINIC | Facility: CLINIC | Age: 61
End: 2021-08-11

## 2021-08-12 NOTE — TELEPHONE ENCOUNTER
Upper sorbian phone line  #[phone] used to translate phone call. Pt called and informed of results and recommendations.  Pt voices understanding.      ----- Message from Donna Riggins MD sent at 8/11/2021  3:18 PM CDT -----  Please inform patient by

## 2021-08-13 ENCOUNTER — TELEPHONE (OUTPATIENT)
Dept: OBGYN CLINIC | Facility: CLINIC | Age: 61
End: 2021-08-13

## 2021-08-13 ENCOUNTER — LAB ENCOUNTER (OUTPATIENT)
Dept: LAB | Age: 61
End: 2021-08-13
Attending: OBSTETRICS & GYNECOLOGY
Payer: MEDICAID

## 2021-08-13 DIAGNOSIS — E55.9 VITAMIN D DEFICIENCY: ICD-10-CM

## 2021-08-13 LAB — VIT D+METAB SERPL-MCNC: 22.6 NG/ML (ref 30–100)

## 2021-08-13 PROCEDURE — 36415 COLL VENOUS BLD VENIPUNCTURE: CPT

## 2021-08-13 PROCEDURE — 82306 VITAMIN D 25 HYDROXY: CPT

## 2021-08-13 RX ORDER — CHOLECALCIFEROL (VITAMIN D3) 1250 MCG
1 CAPSULE ORAL WEEKLY
Qty: 12 CAPSULE | Refills: 0 | Status: SHIPPED | OUTPATIENT
Start: 2021-08-13

## 2022-02-14 ENCOUNTER — OFFICE VISIT (OUTPATIENT)
Dept: OBGYN CLINIC | Facility: CLINIC | Age: 62
End: 2022-02-14
Payer: MEDICAID

## 2022-02-14 VITALS — WEIGHT: 155 LBS | DIASTOLIC BLOOD PRESSURE: 80 MMHG | BODY MASS INDEX: 26 KG/M2 | SYSTOLIC BLOOD PRESSURE: 110 MMHG

## 2022-02-14 DIAGNOSIS — R39.9 UTI SYMPTOMS: Primary | ICD-10-CM

## 2022-02-14 LAB
APPEARANCE: CLEAR
BILIRUB UR QL: NEGATIVE
BILIRUBIN: NEGATIVE
CLARITY UR: CLEAR
COLOR UR: YELLOW
GLUCOSE (URINE DIPSTICK): NEGATIVE MG/DL
GLUCOSE UR-MCNC: NEGATIVE MG/DL
HGB UR QL STRIP.AUTO: NEGATIVE
KETONES (URINE DIPSTICK): NEGATIVE MG/DL
KETONES UR-MCNC: NEGATIVE MG/DL
MULTISTIX LOT#: ABNORMAL NUMERIC
NITRITE UR QL STRIP.AUTO: NEGATIVE
NITRITE, URINE: NEGATIVE
OCCULT BLOOD: NEGATIVE
PH UR: 6 [PH] (ref 5–8)
PH, URINE: 5.5 (ref 4.5–8)
PROT UR-MCNC: NEGATIVE MG/DL
SP GR UR STRIP: 1.01 (ref 1–1.03)
SPECIFIC GRAVITY: 1.01 (ref 1–1.03)
URINE-COLOR: YELLOW
UROBILINOGEN UR STRIP-ACNC: <2
UROBILINOGEN,SEMI-QN: 0.2 MG/DL (ref 0–1.9)

## 2022-02-14 PROCEDURE — 3074F SYST BP LT 130 MM HG: CPT | Performed by: OBSTETRICS & GYNECOLOGY

## 2022-02-14 PROCEDURE — 3079F DIAST BP 80-89 MM HG: CPT | Performed by: OBSTETRICS & GYNECOLOGY

## 2022-02-14 PROCEDURE — 81002 URINALYSIS NONAUTO W/O SCOPE: CPT | Performed by: OBSTETRICS & GYNECOLOGY

## 2022-02-14 PROCEDURE — 99213 OFFICE O/P EST LOW 20 MIN: CPT | Performed by: OBSTETRICS & GYNECOLOGY

## 2022-02-14 RX ORDER — NITROFURANTOIN 25; 75 MG/1; MG/1
100 CAPSULE ORAL 2 TIMES DAILY
Qty: 10 CAPSULE | Refills: 0 | Status: SHIPPED | OUTPATIENT
Start: 2022-02-14 | End: 2022-02-19

## 2022-02-14 RX ORDER — BIOTIN 1 MG
1 TABLET ORAL DAILY
Qty: 60 CAPSULE | Refills: 5 | Status: SHIPPED | OUTPATIENT
Start: 2022-02-14

## 2022-03-01 ENCOUNTER — OFFICE VISIT (OUTPATIENT)
Dept: OBGYN CLINIC | Facility: CLINIC | Age: 62
End: 2022-03-01
Payer: MEDICAID

## 2022-03-01 VITALS — WEIGHT: 159 LBS | BODY MASS INDEX: 26 KG/M2

## 2022-03-01 DIAGNOSIS — Z01.419 WELL WOMAN EXAM WITH ROUTINE GYNECOLOGICAL EXAM: Primary | ICD-10-CM

## 2022-03-01 DIAGNOSIS — Z12.31 SCREENING MAMMOGRAM FOR BREAST CANCER: ICD-10-CM

## 2022-03-01 PROCEDURE — 99396 PREV VISIT EST AGE 40-64: CPT | Performed by: OBSTETRICS & GYNECOLOGY

## 2022-04-11 ENCOUNTER — HOSPITAL ENCOUNTER (OUTPATIENT)
Dept: GENERAL RADIOLOGY | Facility: HOSPITAL | Age: 62
Discharge: HOME | End: 2022-04-11
Attending: PHYSICAL MEDICINE & REHABILITATION
Payer: OTHER MISCELLANEOUS

## 2022-04-11 ENCOUNTER — TELEPHONE (OUTPATIENT)
Dept: NEUROLOGY | Facility: CLINIC | Age: 62
End: 2022-04-11

## 2022-04-11 ENCOUNTER — OFFICE VISIT (OUTPATIENT)
Dept: PHYSICAL MEDICINE AND REHAB | Facility: CLINIC | Age: 62
End: 2022-04-11
Payer: MEDICAID

## 2022-04-11 VITALS
BODY MASS INDEX: 24.91 KG/M2 | DIASTOLIC BLOOD PRESSURE: 86 MMHG | HEART RATE: 73 BPM | WEIGHT: 155 LBS | HEIGHT: 66 IN | OXYGEN SATURATION: 96 % | SYSTOLIC BLOOD PRESSURE: 120 MMHG

## 2022-04-11 DIAGNOSIS — M54.12 RIGHT CERVICAL RADICULOPATHY: ICD-10-CM

## 2022-04-11 DIAGNOSIS — Z98.890 S/P RIGHT ROTATOR CUFF REPAIR: ICD-10-CM

## 2022-04-11 DIAGNOSIS — M54.12 RIGHT CERVICAL RADICULOPATHY: Primary | ICD-10-CM

## 2022-04-11 PROCEDURE — 3008F BODY MASS INDEX DOCD: CPT | Performed by: PHYSICAL MEDICINE & REHABILITATION

## 2022-04-11 PROCEDURE — 99244 OFF/OP CNSLTJ NEW/EST MOD 40: CPT | Performed by: PHYSICAL MEDICINE & REHABILITATION

## 2022-04-11 PROCEDURE — 72050 X-RAY EXAM NECK SPINE 4/5VWS: CPT | Performed by: PHYSICAL MEDICINE & REHABILITATION

## 2022-04-11 PROCEDURE — 3079F DIAST BP 80-89 MM HG: CPT | Performed by: PHYSICAL MEDICINE & REHABILITATION

## 2022-04-11 PROCEDURE — 3074F SYST BP LT 130 MM HG: CPT | Performed by: PHYSICAL MEDICINE & REHABILITATION

## 2022-04-11 RX ORDER — PREGABALIN 75 MG/1
75 CAPSULE ORAL 2 TIMES DAILY
Qty: 60 CAPSULE | Refills: 0 | Status: SHIPPED | OUTPATIENT
Start: 2022-04-11

## 2022-04-11 NOTE — PATIENT INSTRUCTIONS
-Start physical therapy and home exercises  -Lyrica 75mg twice daily   -Ice/Heat as tolerated  -Xray on the way out today  -MRI of the cervical spine and follow up after  -Please stop the medication if you have any side effects and call the office if you have any questions or concerns

## 2022-04-12 ENCOUNTER — TELEPHONE (OUTPATIENT)
Dept: PHYSICAL MEDICINE AND REHAB | Facility: CLINIC | Age: 62
End: 2022-04-12

## 2022-04-13 ENCOUNTER — TELEPHONE (OUTPATIENT)
Dept: NEUROLOGY | Facility: CLINIC | Age: 62
End: 2022-04-13

## 2022-04-13 ENCOUNTER — MED REC SCAN ONLY (OUTPATIENT)
Dept: PHYSICAL MEDICINE AND REHAB | Facility: CLINIC | Age: 62
End: 2022-04-13

## 2022-04-13 NOTE — TELEPHONE ENCOUNTER
[employer] for authorization of approval for MRI C-spine wo cpt code 52437. Uploaded clinical note.  Case Number: [id number] pending approval.

## 2022-04-13 NOTE — TELEPHONE ENCOUNTER
This encounter closed. Refer to referral specialist Gisel Larios LPN 18/16/30 TE note for determination.

## 2022-04-14 ENCOUNTER — TELEPHONE (OUTPATIENT)
Dept: PHYSICAL MEDICINE AND REHAB | Facility: CLINIC | Age: 62
End: 2022-04-14

## 2022-04-14 NOTE — TELEPHONE ENCOUNTER
Patient called back and stated she did NOT want to go thru her private ins. She wants her  to try and get it apprvd thru W/C.

## 2022-04-14 NOTE — TELEPHONE ENCOUNTER
NEW INFO:    Pt is under W/C   info:     [facility]     [phone] / Fax: [phone]  [facility]: [phone]

## 2022-04-14 NOTE — TELEPHONE ENCOUNTER
[employer]ette online to check on status of MRI C-spine. Authorization Number: [id number] valid 4/14/2022-10/11/2022.  caridad/m on Kathy's v/m advising of approval.

## 2022-04-14 NOTE — TELEPHONE ENCOUNTER
Claim: [id number]  S/w 10 Angelia Merino claims [email or web address] Had ORTIZ in hospitals 1690 with findings of [employer]. They are NOT authorizing any treatments/procedures at this time. L/m on pt's v/m advising of above.

## 2022-05-05 ENCOUNTER — TELEPHONE (OUTPATIENT)
Dept: NEUROLOGY | Facility: CLINIC | Age: 62
End: 2022-05-05

## 2022-05-23 RX ORDER — PREGABALIN 75 MG/1
CAPSULE ORAL
Qty: 60 CAPSULE | Refills: 0 | Status: SHIPPED | OUTPATIENT
Start: 2022-05-23

## 2022-05-23 NOTE — TELEPHONE ENCOUNTER
Refill Request    Medication request: pregabalin (LYRICA) 75 MG Oral Cap  Take 1 capsule (75 mg total) by mouth 2 (two) times daily.     Mehgan Hernandes DO   Due back to clinic per last office note:  after MRI - MRI denies as patient has reached [facility]: Visit date not found      [place] 04[place]11/22 #60    LOV plan (if weaning or changing medications): -Lyrica 75mg twice daily

## 2022-06-02 ENCOUNTER — OFFICE VISIT (OUTPATIENT)
Dept: PHYSICAL MEDICINE AND REHAB | Facility: CLINIC | Age: 62
End: 2022-06-02
Payer: MEDICAID

## 2022-06-02 VITALS
BODY MASS INDEX: 24.91 KG/M2 | SYSTOLIC BLOOD PRESSURE: 120 MMHG | OXYGEN SATURATION: 98 % | HEIGHT: 66 IN | DIASTOLIC BLOOD PRESSURE: 86 MMHG | HEART RATE: 88 BPM | WEIGHT: 155 LBS

## 2022-06-02 DIAGNOSIS — Z98.890 S/P RIGHT ROTATOR CUFF REPAIR: ICD-10-CM

## 2022-06-02 DIAGNOSIS — M54.12 RIGHT CERVICAL RADICULOPATHY: Primary | ICD-10-CM

## 2022-06-02 NOTE — PATIENT INSTRUCTIONS
-Start PT and home exercises  -Continue Lyrica twice daily  -Will discuss injection in shoulder if no better  -Follow up in 4 weeks

## 2022-06-02 NOTE — PROGRESS NOTES
Spoke with Jonelle Brown from [facility] [address]  P: [phone]  [phone]  Unable to send us report due to no radiologist is in office.

## 2022-06-03 ENCOUNTER — ORDER TRANSCRIPTION (OUTPATIENT)
Dept: PHYSICAL THERAPY | Facility: HOSPITAL | Age: 62
End: 2022-06-03

## 2022-06-03 DIAGNOSIS — M54.12 RIGHT CERVICAL RADICULOPATHY: Primary | ICD-10-CM

## 2022-06-07 ENCOUNTER — TELEPHONE (OUTPATIENT)
Dept: PHYSICAL MEDICINE AND REHAB | Facility: CLINIC | Age: 62
End: 2022-06-07

## 2022-06-07 ENCOUNTER — TELEPHONE (OUTPATIENT)
Dept: NEUROLOGY | Facility: CLINIC | Age: 62
End: 2022-06-07

## 2022-06-07 NOTE — TELEPHONE ENCOUNTER
Received Final Report for C-Spine MRI. Placed in [facility] folder for review.

## 2022-06-08 NOTE — TELEPHONE ENCOUNTER
Patient calling back regarding her test results she can be called from 3 to 4 when she is out of work.

## 2022-06-09 NOTE — TELEPHONE ENCOUNTER
S/w pt, shared MRI results per Dr. Xiao Spain review. Pt had questions about worker's comp and obtaining authorization for future visits including physical therapy. Advised her to reach out to her adviser for approvals and for that to be faxed to office. Patient also requesting MRI report, advised her to contact either radiology dept or medical records. Pt verbalized understanding.

## 2022-06-21 ENCOUNTER — TELEPHONE (OUTPATIENT)
Dept: PHYSICAL MEDICINE AND REHAB | Facility: CLINIC | Age: 62
End: 2022-06-21

## 2022-06-28 ENCOUNTER — TELEPHONE (OUTPATIENT)
Dept: NEUROLOGY | Facility: CLINIC | Age: 62
End: 2022-06-28

## 2022-06-28 NOTE — TELEPHONE ENCOUNTER
Received PT Initial Evaluation  to be reviewed and signed by physician. Placed in [facility].

## 2022-06-29 ENCOUNTER — TELEPHONE (OUTPATIENT)
Dept: PHYSICAL THERAPY | Facility: HOSPITAL | Age: 62
End: 2022-06-29

## 2022-07-05 ENCOUNTER — APPOINTMENT (OUTPATIENT)
Dept: PHYSICAL THERAPY | Age: 62
End: 2022-07-05
Attending: PHYSICAL MEDICINE & REHABILITATION
Payer: OTHER MISCELLANEOUS

## 2022-07-05 ENCOUNTER — TELEPHONE (OUTPATIENT)
Dept: PHYSICAL THERAPY | Age: 62
End: 2022-07-05

## 2022-07-05 RX ORDER — PREGABALIN 75 MG/1
CAPSULE ORAL
Qty: 60 CAPSULE | Refills: 0 | Status: SHIPPED | OUTPATIENT
Start: 2022-07-05

## 2022-07-05 NOTE — TELEPHONE ENCOUNTER
Refill Request    Medication request: PREGABALIN 75 MG Oral Cap    LOV:6/2/2022 Alonzo Zambrano DO   Due back to clinic per last office note:  Mirtha Stephens: Visit date not found      [place]: 5/23/22 #60    Urine drug screen (if applicable): None  Pain contract: None    LOV plan (if weaning or changing medications): Continue Lyrica

## 2022-07-06 ENCOUNTER — MED REC SCAN ONLY (OUTPATIENT)
Dept: PHYSICAL MEDICINE AND REHAB | Facility: CLINIC | Age: 62
End: 2022-07-06

## 2022-07-07 ENCOUNTER — APPOINTMENT (OUTPATIENT)
Dept: PHYSICAL THERAPY | Age: 62
End: 2022-07-07
Attending: PHYSICAL MEDICINE & REHABILITATION
Payer: OTHER MISCELLANEOUS

## 2022-07-14 ENCOUNTER — APPOINTMENT (OUTPATIENT)
Dept: PHYSICAL THERAPY | Age: 62
End: 2022-07-14
Attending: PHYSICAL MEDICINE & REHABILITATION
Payer: OTHER MISCELLANEOUS

## 2022-07-19 ENCOUNTER — APPOINTMENT (OUTPATIENT)
Dept: PHYSICAL THERAPY | Age: 62
End: 2022-07-19
Attending: PHYSICAL MEDICINE & REHABILITATION
Payer: OTHER MISCELLANEOUS

## 2022-07-21 ENCOUNTER — APPOINTMENT (OUTPATIENT)
Dept: PHYSICAL THERAPY | Age: 62
End: 2022-07-21
Attending: PHYSICAL MEDICINE & REHABILITATION
Payer: OTHER MISCELLANEOUS

## 2022-07-26 ENCOUNTER — APPOINTMENT (OUTPATIENT)
Dept: PHYSICAL THERAPY | Age: 62
End: 2022-07-26
Attending: PHYSICAL MEDICINE & REHABILITATION
Payer: OTHER MISCELLANEOUS

## 2022-07-28 ENCOUNTER — APPOINTMENT (OUTPATIENT)
Dept: PHYSICAL THERAPY | Age: 62
End: 2022-07-28
Attending: PHYSICAL MEDICINE & REHABILITATION
Payer: OTHER MISCELLANEOUS

## 2022-08-02 ENCOUNTER — APPOINTMENT (OUTPATIENT)
Dept: PHYSICAL THERAPY | Age: 62
End: 2022-08-02
Attending: PHYSICAL MEDICINE & REHABILITATION
Payer: OTHER MISCELLANEOUS

## 2022-08-03 ENCOUNTER — TELEPHONE (OUTPATIENT)
Dept: PHYSICAL MEDICINE AND REHAB | Facility: CLINIC | Age: 62
End: 2022-08-03

## 2022-08-04 ENCOUNTER — MED REC SCAN ONLY (OUTPATIENT)
Dept: PHYSICAL MEDICINE AND REHAB | Facility: CLINIC | Age: 62
End: 2022-08-04

## 2022-08-08 ENCOUNTER — TELEPHONE (OUTPATIENT)
Dept: PHYSICAL MEDICINE AND REHAB | Facility: CLINIC | Age: 62
End: 2022-08-08

## 2022-08-08 NOTE — TELEPHONE ENCOUNTER
Spoke to patient she advised she is using W/C   I told her to have her adjustor fax over authorization before we can schedule the f/u.

## 2022-08-08 NOTE — TELEPHONE ENCOUNTER
Placed in Dr. Sindy Bay in office bin for further review.        Please scheduled a follow up - virtual

## 2022-08-09 NOTE — TELEPHONE ENCOUNTER
Refill Request    Medication request: PREGABALIN 75 MG Oral Cap    LOV:6/2/2022 Natty Hayes DO   Due back to clinic per last office note:  4 weeks  NOV: Visit date not found      [place]: 7/5/22 #60    Urine drug screen (if applicable): None  Pain contract: None    LOV plan (if weaning or changing medications): Continue Lyrica

## 2022-08-10 RX ORDER — PREGABALIN 75 MG/1
CAPSULE ORAL
Qty: 60 CAPSULE | Refills: 0 | Status: SHIPPED | OUTPATIENT
Start: 2022-08-10

## 2022-09-04 ENCOUNTER — HOSPITAL ENCOUNTER (EMERGENCY)
Facility: HOSPITAL | Age: 62
Discharge: HOME | End: 2022-09-04
Attending: EMERGENCY MEDICINE
Payer: MEDICAID

## 2022-09-04 VITALS
OXYGEN SATURATION: 99 % | SYSTOLIC BLOOD PRESSURE: 130 MMHG | DIASTOLIC BLOOD PRESSURE: 79 MMHG | RESPIRATION RATE: 18 BRPM | WEIGHT: 150 LBS | HEIGHT: 66 IN | BODY MASS INDEX: 24.11 KG/M2 | HEART RATE: 74 BPM | TEMPERATURE: 98 F

## 2022-09-04 DIAGNOSIS — K11.20 SIALOADENITIS: Primary | ICD-10-CM

## 2022-09-04 PROCEDURE — 99283 EMERGENCY DEPT VISIT LOW MDM: CPT

## 2022-09-04 RX ORDER — AMOXICILLIN AND CLAVULANATE POTASSIUM 875; 125 MG/1; MG/1
1 TABLET, FILM COATED ORAL 2 TIMES DAILY
Qty: 14 TABLET | Refills: 0 | Status: SHIPPED | OUTPATIENT
Start: 2022-09-04 | End: 2022-09-11

## 2022-09-04 NOTE — ED INITIAL ASSESSMENT (HPI)
Pt to ED with c/o right jaw pain that starts near upper jaw and ear. Pt states difficulty opening mouth due to pain. Denies fall or trauma. Pt denies fever. Pt ambulating by self with steady gait. Speech clear. Face symmetrical. Tongue midline. Strength equal and strong to all extremities. Pt denies chest pain or sob. No respiratory distress noted. Pt is alert and oriented x4.

## 2022-09-04 NOTE — ED QUICK NOTES
Pt A&OX4, pt denies dizziness/lightheadedness, cp, sob, n/v. Discharge paperwork, prescription, and followup discussed with pt, pt verbally understands them. Pt discharged ambulatory with steady gait - no distress ntoed.

## 2022-09-20 ENCOUNTER — OFFICE VISIT (OUTPATIENT)
Dept: OTOLARYNGOLOGY | Facility: CLINIC | Age: 62
End: 2022-09-20

## 2022-09-20 DIAGNOSIS — H92.01 RIGHT EAR PAIN: Primary | ICD-10-CM

## 2022-09-20 PROCEDURE — 99202 OFFICE O/P NEW SF 15 MIN: CPT | Performed by: OTOLARYNGOLOGY

## 2022-09-20 RX ORDER — NITROGLYCERIN 0.4 MG/1
TABLET SUBLINGUAL
COMMUNITY
Start: 2022-09-13

## 2022-09-20 RX ORDER — MELOXICAM 15 MG/1
15 TABLET ORAL DAILY
Qty: 30 TABLET | Refills: 3 | Status: SHIPPED | OUTPATIENT
Start: 2022-09-20

## 2022-09-20 RX ORDER — MULTIVITAMIN
1 TABLET ORAL DAILY
COMMUNITY
Start: 2022-07-01

## 2022-09-20 RX ORDER — LORATADINE 10 MG/1
TABLET ORAL
COMMUNITY
Start: 2022-06-30

## 2022-09-20 RX ORDER — ATORVASTATIN CALCIUM 40 MG/1
40 TABLET, FILM COATED ORAL NIGHTLY
COMMUNITY
Start: 2022-09-14

## 2022-09-20 RX ORDER — ERGOCALCIFEROL 1.25 MG/1
CAPSULE ORAL
COMMUNITY
Start: 2022-09-13

## 2022-09-20 RX ORDER — CYCLOBENZAPRINE HCL 5 MG
5 TABLET ORAL NIGHTLY
Qty: 30 TABLET | Refills: 1 | Status: SHIPPED | OUTPATIENT
Start: 2022-09-20

## 2022-09-22 ENCOUNTER — OFFICE VISIT (OUTPATIENT)
Dept: PHYSICAL MEDICINE AND REHAB | Facility: CLINIC | Age: 62
End: 2022-09-22

## 2022-09-22 VITALS — HEART RATE: 71 BPM | BODY MASS INDEX: 24 KG/M2 | WEIGHT: 150 LBS | OXYGEN SATURATION: 99 %

## 2022-09-22 DIAGNOSIS — Z98.890 S/P RIGHT ROTATOR CUFF REPAIR: Primary | ICD-10-CM

## 2022-09-22 DIAGNOSIS — M54.12 RIGHT CERVICAL RADICULOPATHY: ICD-10-CM

## 2022-09-22 DIAGNOSIS — M67.912 TENDINOPATHY OF ROTATOR CUFF, LEFT: ICD-10-CM

## 2022-09-22 PROCEDURE — 99214 OFFICE O/P EST MOD 30 MIN: CPT | Performed by: PHYSICAL MEDICINE & REHABILITATION

## 2022-09-22 NOTE — PATIENT INSTRUCTIONS
-MRI arthrogram right shoulder  -MRI left shoulder as well  -Follow up after imaging  -Can consider injection after imaging

## 2022-09-26 ENCOUNTER — PATIENT MESSAGE (OUTPATIENT)
Dept: PHYSICAL MEDICINE AND REHAB | Facility: CLINIC | Age: 62
End: 2022-09-26

## 2022-09-26 NOTE — TELEPHONE ENCOUNTER
Left message informing patient she can proceed with scheduling the imaging recommended by Dr. Kyra Farfan which will start the authorization process.  [address] [place] [address] scheduling number provided. ([phone])

## 2022-09-27 ENCOUNTER — TELEPHONE (OUTPATIENT)
Dept: PHYSICAL MEDICINE AND REHAB | Facility: CLINIC | Age: 62
End: 2022-09-27

## 2022-10-03 RX ORDER — PREGABALIN 75 MG/1
CAPSULE ORAL
Qty: 60 CAPSULE | Refills: 0 | Status: SHIPPED | OUTPATIENT
Start: 2022-10-03

## 2022-10-03 NOTE — TELEPHONE ENCOUNTER
Refill Request    Medication request: PREGABALIN 75 MG Oral Cap    LMY:6/92/7543 Dianelys Ram,    Due back to clinic per last office note:  Artem Mendez: Visit date not found      [place]: 8/10/22 #60    Urine drug screen (if applicable): None  Pain contract: None    LOV plan (if weaning or changing medications): N/A.

## 2022-10-19 ENCOUNTER — TELEPHONE (OUTPATIENT)
Dept: NEUROLOGY | Facility: CLINIC | Age: 62
End: 2022-10-19

## 2022-10-19 NOTE — TELEPHONE ENCOUNTER
Patient calling to speak to a nurse to confirm what type of MRI she needs as the person who spoke to in central scheduling is requesting, clarification on the tests she is requesting, she also wants clarification from [address] as she wants to take care on the R-Shoulder not the Left.  Please call her ASAP she is very confused

## 2022-10-19 NOTE — TELEPHONE ENCOUNTER
Reviewing LOV:9/22/22 C/o R shoulder pain. MRI ordered for L shoulder. Please place new order & will call patient & radiology.

## 2022-11-14 RX ORDER — PREGABALIN 75 MG/1
CAPSULE ORAL
Qty: 60 CAPSULE | Refills: 2 | Status: SHIPPED | OUTPATIENT
Start: 2022-11-14

## 2022-11-15 ENCOUNTER — HOSPITAL ENCOUNTER (OUTPATIENT)
Dept: GENERAL RADIOLOGY | Facility: HOSPITAL | Age: 62
Discharge: HOME | End: 2022-11-15
Attending: PHYSICAL MEDICINE & REHABILITATION
Payer: COMMERCIAL

## 2022-11-15 ENCOUNTER — TELEPHONE (OUTPATIENT)
Dept: PAIN CLINIC | Facility: CLINIC | Age: 62
End: 2022-11-15

## 2022-11-15 DIAGNOSIS — Z98.890 S/P RIGHT ROTATOR CUFF REPAIR: ICD-10-CM

## 2022-11-15 RX ORDER — LIDOCAINE HYDROCHLORIDE 10 MG/ML
INJECTION, SOLUTION EPIDURAL; INFILTRATION; INTRACAUDAL; PERINEURAL
Status: DISCONTINUED
Start: 2022-11-15 | End: 2022-11-15 | Stop reason: WASHOUT

## 2022-11-15 NOTE — TELEPHONE ENCOUNTER
The request for pt's R Shoulder Arthrogram, CPT 88093, has been denied by ins. Shawna Weinstein has received & reviewed clinical, but states an XR of shoulder needs to be completed. If you would like a same day P2P, please call Raulito Shawna [facility] P2P # [phone] Option # 1. Case # [id number]. Pt will be notified of denial.    Thank you.

## 2022-11-16 NOTE — TELEPHONE ENCOUNTER
MRI SHOULDER ARTHROGRAM(CONTRAST ONLY),RIGHT SH(CPT=73222)   Referral # [id number]     Status:  DENIED    The reason is,         You are allotted an additional seven calendar days from 11/15/22 adverse  determination to complete a scheduled [id number] discussion with a Medical Director (by calling [phone], select Option 4). Reference number: [id number]    Hkud-bb-[employer] allowed through [employer] until 11/21/2022 12:00:00 AM.    First Level Appeal allowed through [employer] until 1/18/2023. Notified Dr. Holman Form for further options.

## 2022-11-21 ENCOUNTER — HOSPITAL ENCOUNTER (OUTPATIENT)
Dept: MRI IMAGING | Facility: HOSPITAL | Age: 62
Discharge: HOME | End: 2022-11-21
Attending: PHYSICAL MEDICINE & REHABILITATION
Payer: COMMERCIAL

## 2022-11-21 ENCOUNTER — HOSPITAL ENCOUNTER (OUTPATIENT)
Dept: GENERAL RADIOLOGY | Facility: HOSPITAL | Age: 62
Discharge: HOME | End: 2022-11-21
Attending: PHYSICAL MEDICINE & REHABILITATION
Payer: COMMERCIAL

## 2022-11-21 DIAGNOSIS — Z98.890 S/P RIGHT ROTATOR CUFF REPAIR: ICD-10-CM

## 2022-11-21 RX ORDER — LIDOCAINE HYDROCHLORIDE 20 MG/ML
INJECTION, SOLUTION EPIDURAL; INFILTRATION; INTRACAUDAL; PERINEURAL
Status: DISCONTINUED
Start: 2022-11-21 | End: 2022-11-21 | Stop reason: WASHOUT

## 2023-01-19 ENCOUNTER — HOSPITAL ENCOUNTER (OUTPATIENT)
Dept: MRI IMAGING | Facility: HOSPITAL | Age: 63
Discharge: HOME | End: 2023-01-19
Attending: PHYSICAL MEDICINE & REHABILITATION
Payer: OTHER MISCELLANEOUS

## 2023-01-19 DIAGNOSIS — M67.912 TENDINOPATHY OF ROTATOR CUFF, LEFT: ICD-10-CM

## 2023-01-19 PROCEDURE — 73221 MRI JOINT UPR EXTREM W/O DYE: CPT | Performed by: PHYSICAL MEDICINE & REHABILITATION

## 2023-01-26 ENCOUNTER — HOSPITAL ENCOUNTER (OUTPATIENT)
Dept: GENERAL RADIOLOGY | Facility: HOSPITAL | Age: 63
Discharge: HOME | End: 2023-01-26
Attending: PHYSICAL MEDICINE & REHABILITATION
Payer: OTHER MISCELLANEOUS

## 2023-01-26 ENCOUNTER — HOSPITAL ENCOUNTER (OUTPATIENT)
Dept: MRI IMAGING | Facility: HOSPITAL | Age: 63
Discharge: HOME | End: 2023-01-26
Attending: PHYSICAL MEDICINE & REHABILITATION
Payer: OTHER MISCELLANEOUS

## 2023-01-26 PROCEDURE — A9575 INJ GADOTERATE MEGLUMI 0.1ML: HCPCS | Performed by: PHYSICAL MEDICINE & REHABILITATION

## 2023-01-26 PROCEDURE — 73222 MRI JOINT UPR EXTREM W/DYE: CPT | Performed by: PHYSICAL MEDICINE & REHABILITATION

## 2023-01-26 PROCEDURE — 23350 INJECTION FOR SHOULDER X-RAY: CPT | Performed by: PHYSICAL MEDICINE & REHABILITATION

## 2023-01-26 PROCEDURE — 77002 NEEDLE LOCALIZATION BY XRAY: CPT | Performed by: PHYSICAL MEDICINE & REHABILITATION

## 2023-01-26 RX ORDER — DIPHENHYDRAMINE HYDROCHLORIDE 50 MG/ML
10 INJECTION, SOLUTION INTRAMUSCULAR; INTRAVENOUS
Status: COMPLETED | OUTPATIENT
Start: 2023-01-26 | End: 2023-01-26

## 2023-01-26 RX ADMIN — DIPHENHYDRAMINE HYDROCHLORIDE 1 ML: 50 INJECTION, SOLUTION INTRAMUSCULAR; INTRAVENOUS at 11:14:00

## 2023-02-01 ENCOUNTER — OFFICE VISIT (OUTPATIENT)
Dept: PHYSICAL MEDICINE AND REHAB | Facility: CLINIC | Age: 63
End: 2023-02-01
Payer: COMMERCIAL

## 2023-02-01 VITALS — HEIGHT: 66 IN | WEIGHT: 150 LBS | BODY MASS INDEX: 24.11 KG/M2

## 2023-02-01 DIAGNOSIS — M54.12 CERVICAL RADICULOPATHY: ICD-10-CM

## 2023-02-01 DIAGNOSIS — Z98.890 S/P RIGHT ROTATOR CUFF REPAIR: Primary | ICD-10-CM

## 2023-02-01 PROCEDURE — 3008F BODY MASS INDEX DOCD: CPT | Performed by: PHYSICAL MEDICINE & REHABILITATION

## 2023-02-01 PROCEDURE — 99214 OFFICE O/P EST MOD 30 MIN: CPT | Performed by: PHYSICAL MEDICINE & REHABILITATION

## 2023-09-22 ENCOUNTER — TELEPHONE (OUTPATIENT)
Dept: NEUROLOGY | Facility: CLINIC | Age: 63
End: 2023-09-22

## 2024-02-29 ENCOUNTER — OFFICE VISIT (OUTPATIENT)
Dept: OCCUPATIONAL MEDICINE | Age: 64
End: 2024-02-29
Attending: NURSE PRACTITIONER

## 2024-02-29 ENCOUNTER — HOSPITAL ENCOUNTER (OUTPATIENT)
Dept: GENERAL RADIOLOGY | Age: 64
Discharge: HOME | End: 2024-02-29
Attending: FAMILY MEDICINE
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
## 2024-02-29 PROCEDURE — 73110 X-RAY EXAM OF WRIST: CPT | Performed by: FAMILY MEDICINE

## 2024-02-29 PROCEDURE — 73560 X-RAY EXAM OF KNEE 1 OR 2: CPT | Performed by: FAMILY MEDICINE

## 2024-03-07 ENCOUNTER — OFFICE VISIT (OUTPATIENT)
Dept: OCCUPATIONAL MEDICINE | Age: 64
End: 2024-03-07
Attending: NURSE PRACTITIONER
Payer: OTHER MISCELLANEOUS

## 2024-03-07 ENCOUNTER — HOSPITAL ENCOUNTER (OUTPATIENT)
Dept: GENERAL RADIOLOGY | Age: 64
Discharge: HOME | End: 2024-03-07
Attending: NURSE PRACTITIONER
Payer: OTHER MISCELLANEOUS
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
## 2024-03-07 PROCEDURE — 73110 X-RAY EXAM OF WRIST: CPT | Performed by: NURSE PRACTITIONER

## 2024-03-14 ENCOUNTER — APPOINTMENT (OUTPATIENT)
Dept: OCCUPATIONAL MEDICINE | Age: 64
End: 2024-03-14
Attending: NURSE PRACTITIONER

## 2024-03-15 ENCOUNTER — ORDER TRANSCRIPTION (OUTPATIENT)
Dept: PHYSICAL THERAPY | Facility: HOSPITAL | Age: 64
End: 2024-03-15

## 2024-03-15 ENCOUNTER — TELEPHONE (OUTPATIENT)
Dept: PHYSICAL THERAPY | Facility: HOSPITAL | Age: 64
End: 2024-03-15

## 2024-03-15 DIAGNOSIS — S83.92XA LEFT KNEE SPRAIN: Primary | ICD-10-CM

## 2024-03-22 ENCOUNTER — TELEPHONE (OUTPATIENT)
Dept: PHYSICAL THERAPY | Facility: HOSPITAL | Age: 64
End: 2024-03-22

## 2024-03-25 ENCOUNTER — TELEPHONE (OUTPATIENT)
Dept: PHYSICAL THERAPY | Facility: HOSPITAL | Age: 64
End: 2024-03-25

## 2024-04-22 ENCOUNTER — TELEPHONE (OUTPATIENT)
Dept: PHYSICAL THERAPY | Facility: HOSPITAL | Age: 64
End: 2024-04-22

## 2024-04-29 ENCOUNTER — APPOINTMENT (OUTPATIENT)
Dept: OCCUPATIONAL MEDICINE | Age: 64
End: 2024-04-29
Attending: NURSE PRACTITIONER

## 2024-06-24 ENCOUNTER — LAB ENCOUNTER (OUTPATIENT)
Dept: LAB | Facility: HOSPITAL | Age: 64
End: 2024-06-24
Attending: ORTHOPAEDIC SURGERY

## 2024-06-24 ENCOUNTER — HOSPITAL ENCOUNTER (OUTPATIENT)
Dept: GENERAL RADIOLOGY | Facility: HOSPITAL | Age: 64
Discharge: HOME | End: 2024-06-24
Attending: ORTHOPAEDIC SURGERY

## 2024-06-24 ENCOUNTER — EKG ENCOUNTER (OUTPATIENT)
Dept: LAB | Facility: HOSPITAL | Age: 64
End: 2024-06-24
Attending: ORTHOPAEDIC SURGERY

## 2024-06-24 DIAGNOSIS — Z01.818 PRE-OP TESTING: ICD-10-CM

## 2024-06-24 DIAGNOSIS — Z01.818 PRE-OP TESTING: Primary | ICD-10-CM

## 2024-06-24 LAB
ANION GAP SERPL CALC-SCNC: 7 MMOL/L (ref 0–18)
APTT PPP: 29.1 SECONDS (ref 23–36)
ATRIAL RATE: 60 BPM
BASOPHILS # BLD AUTO: 0.05 X10(3) UL (ref 0–0.2)
BASOPHILS NFR BLD AUTO: 1 %
BUN BLD-MCNC: 11 MG/DL (ref 9–23)
BUN/CREAT SERPL: 13.1 (ref 10–20)
CALCIUM BLD-MCNC: 10.2 MG/DL (ref 8.7–10.4)
CHLORIDE SERPL-SCNC: 108 MMOL/L (ref 98–112)
CO2 SERPL-SCNC: 29 MMOL/L (ref 21–32)
CREAT BLD-MCNC: 0.84 MG/DL
DEPRECATED RDW RBC AUTO: 38.4 FL (ref 35.1–46.3)
EGFRCR SERPLBLD CKD-EPI 2021: 78 ML/MIN/1.73M2 (ref 60–?)
EOSINOPHIL # BLD AUTO: 0.16 X10(3) UL (ref 0–0.7)
EOSINOPHIL NFR BLD AUTO: 3.1 %
ERYTHROCYTE [DISTWIDTH] IN BLOOD BY AUTOMATED COUNT: 13 % (ref 11–15)
FASTING STATUS PATIENT QL REPORTED: YES
GLUCOSE BLD-MCNC: 100 MG/DL (ref 70–99)
HCT VFR BLD AUTO: 42.5 %
HGB BLD-MCNC: 14.4 G/DL
IMM GRANULOCYTES # BLD AUTO: 0.01 X10(3) UL (ref 0–1)
IMM GRANULOCYTES NFR BLD: 0.2 %
INR BLD: 0.94 (ref 0.8–1.2)
LYMPHOCYTES # BLD AUTO: 1.56 X10(3) UL (ref 1–4)
LYMPHOCYTES NFR BLD AUTO: 30.4 %
MCH RBC QN AUTO: 27.6 PG (ref 26–34)
MCHC RBC AUTO-ENTMCNC: 33.9 G/DL (ref 31–37)
MCV RBC AUTO: 81.4 FL
MONOCYTES # BLD AUTO: 0.35 X10(3) UL (ref 0.1–1)
MONOCYTES NFR BLD AUTO: 6.8 %
NEUTROPHILS # BLD AUTO: 3.01 X10 (3) UL (ref 1.5–7.7)
NEUTROPHILS # BLD AUTO: 3.01 X10(3) UL (ref 1.5–7.7)
NEUTROPHILS NFR BLD AUTO: 58.5 %
OSMOLALITY SERPL CALC.SUM OF ELEC: 297 MOSM/KG (ref 275–295)
P AXIS: 54 DEGREES
P-R INTERVAL: 188 MS
PLATELET # BLD AUTO: 305 10(3)UL (ref 150–450)
POTASSIUM SERPL-SCNC: 4.4 MMOL/L (ref 3.5–5.1)
PROTHROMBIN TIME: 13.1 SECONDS (ref 11.6–14.8)
Q-T INTERVAL: 384 MS
QRS DURATION: 80 MS
QTC CALCULATION (BEZET): 384 MS
R AXIS: 71 DEGREES
RBC # BLD AUTO: 5.22 X10(6)UL
SODIUM SERPL-SCNC: 144 MMOL/L (ref 136–145)
T AXIS: 50 DEGREES
VENTRICULAR RATE: 60 BPM
WBC # BLD AUTO: 5.1 X10(3) UL (ref 4–11)

## 2024-06-24 PROCEDURE — 71046 X-RAY EXAM CHEST 2 VIEWS: CPT | Performed by: ORTHOPAEDIC SURGERY

## 2024-06-24 PROCEDURE — 93005 ELECTROCARDIOGRAM TRACING: CPT

## 2024-06-24 PROCEDURE — 93010 ELECTROCARDIOGRAM REPORT: CPT | Performed by: INTERNAL MEDICINE

## 2024-06-24 PROCEDURE — 85610 PROTHROMBIN TIME: CPT

## 2024-06-24 PROCEDURE — 85025 COMPLETE CBC W/AUTO DIFF WBC: CPT

## 2024-06-24 PROCEDURE — 36415 COLL VENOUS BLD VENIPUNCTURE: CPT

## 2024-06-24 PROCEDURE — 85730 THROMBOPLASTIN TIME PARTIAL: CPT

## 2024-06-24 PROCEDURE — 80048 BASIC METABOLIC PNL TOTAL CA: CPT

## 2024-07-15 NOTE — DISCHARGE INSTRUCTIONS
por favor consulte las instrucciones del folleto       CIRUGIA AMBULATORIA: INSTRUCCIONES DESPUES DE PETE RECIBIDO ANESTESIA  Debido a la Anestesia y a las medicamentos que se le aplicaron frank la cirugia, fransisca reflejos y capacidaddiscernimiento pueden verse afectados. Tambien podria tener un poco de mareo.Aparte de siguir las precauciones de sentico comun, le recomendamos lo siguiente:     El paciente debe estar acompañado por alguien hasta la mañana siguiente.     No maneje ningun vehiculo automotor ni monte bicicleta.     No tome ninguna decision importante funmilayo por ejemplo firmar de documentos importantes.     No opere herramientas electricas ni electrodomesticos, tales funmilayo cuchillos electricos, batidoras electricas o serruchos electricos. No abdulaziz el cesped con cortadoras electricas. No practique deportes.     No angela ejercicio.     Para evitar las nauseas, coma menos de lo normal mas o menos la mitad de lo habitual y/o miguelito solo liquidos hasta la manana siguiente. Consulte con beltran doctor si esta llevando cynthia dieta especial.     No tome bebidas alcoholicas, tranquilizantes, pastilles para dormir etc., y verifique con beltran doctor acerca de cualquier medicamento que andrey tomando actualmente.     El efecto de la medicación usada en beltran anestesia habra pasado la por completo a la medianoche. Por lo tanto, puede reanudar fransisca habitos cotidianos en la mañana.     Los adultos deben descansar lo brad posible por las siguientes 24 horas. Los niños deben permanecer en cama lo brad posible por las siguientes 24 horas.     Si se presenta cualquier problema, puede llamar a beltran propio doctor personal o aceda al centro de [facility].    Si sigue estas instrucciones, se sentira major y estara mas seguro despues de beltran cirugia ambulatoria. Sitiene cualquier pregunta, llame al hospital y pida que lo comuniquen con la enfermera de cirugia [phone], extension  [phone].    Instrucciones para el eileen: después de beltran cirugía   Acaba de someterse a cynthia cirugía. Frank la cirugía, le administraron un tipo de medicamento llamado anestesia para que esté relajado y no sienta dolor. Después de la cirugía, meredith vez sienta algo de dolor o náuseas. [place] es común. Estos son algunos consejos para sentirse mejor y recuperarse emmie después de la cirugía.   El regreso a casa  Beltran proveedor de atención médica le enseñará cómo cuidarse cuando regrese a beltran casa. También responderá fransisca preguntas. Pida a un familiar o amigo adulto que lo conduzca a beltran casa. Frank las primeras 24 horas después de la cirugía, siga estas recomendaciones:   No conduzca ni use maquinaria pesada.  No tome decisiones importantes ni firme ningún documento legal.  Adminístrese los medicamentos según las indicaciones.  Evite el consumo de alcohol.  Si es necesario, coordine para que alguien se quede con usted. Esta persona puede vigilar cualquier problema que se presente y lo ayudará a permanecer seguro.  Asegúrese de asistir a todas fransisca visitas de control con beltran proveedor de atención médica. Y descanse después de la cirugía frank el tiempo que le indique beltran proveedor.   Cómo sobrellevar el dolor  Si siente dolor después de la cirugía, los analgésicos lo ayudarán a sentirse mejor. [place] los analgésicos según las indicaciones, antes de que el dolor se intensifique. Además, pregunte a beltran proveedor de atención médica o al farmacéutico acerca de otras formas de controlar el dolor. Estas podrían incluir aplicar calor o hielo, o hacer ejercicios de relajación. Y siga todas las instrucciones que le dé beltran cirujano o enfermero.      Cumpla el cronograma de fransisca medicamentos.     Consejos para yoon analgésicos  Para aliviar el dolor lo demario posible, recuerde estos puntos:   Los analgésicos pueden causar malestar estomacal. Tomarlos con un poco de comida puede aliviar andrey efecto.  La mayoría de los calmantes que se veronica por la  boca necesitan por lo menos de 20 a 30 minutos para surtir efecto.  No espere hasta que beltran dolor se vuelva intenso para abram el analgésico que le indicaron. Intente que el momento en que puede abram beltran medicamento coincida con otra actividad. Chelsie podría ser el momento antes de vestirse, rigoberto un paseo o sentarse a la jacob para cenar.  El estreñimiento es un efecto secundario frecuente de algunos analgésicos. Consulte a beltran proveedor de atención médica antes de usar cualquier medicamento, funmilayo laxantes o ablandadores de heces, para ayudar a aliviar el estreñimiento. También consulte si es preciso evitar algún tipo de alimento. Abram mucha cantidad de líquido y comer alimentos funmilayo frutas y verduras con alto contenido de fibra también puede ser beneficioso. Recuerde que no debe abram laxantes a menos que beltran cirujano se los indique.  Mezclar bebidas alcohólicas y analgésicos puede causar mareos y enlentecer beltran respiración. Y hasta puede ser mortal. No miguelito alcohol mientras esté tomando calmantes.  Los analgésicos pueden hacer que tenga reacciones más lentas. No conduzca ni opere maquinaria mientras esté tomando analgésicos.  Beltran proveedor de atención médica puede indicarle que tome acetaminofén (paracetamol) para ayudar a aliviar el dolor. Pregúntele qué cantidad debe abram por día. El acetaminofén y otros analgésicos pueden interactuar con fransisca medicamentos recetados u otros medicamentos de venta lucio (OTC, por fransisca siglas en inglés). Algunos medicamentos recetados contienen acetaminofén y otros ingredientes. Combinar medicamentos recetados y acetaminofén de venta lucio para aliviar el dolor puede provocarle cynthia sobredosis accidental. Allyn atentamente la etiqueta del envase de fransisca medicamentos OTC. Many lo ayudará a saber con exactitud la lista de ingredientes, la cantidad que debe abram y cualquier advertencia. Many también puede ayudarlo a evitar abram demasiado acetaminofén. Si tiene preguntas o no entiende la  información, pídale a beltran farmacéutico o proveedor de atención médica que se la explique antes de yoon el medicamento OTC.   Manejo de las náuseas  Algunas personas pueden sentir malestar estomacal (náuseas) después de la cirugía. [place] suele suceder debido a la anestesia, el dolor, los analgésicos, la disminución del movimiento de la comida en el estómago o el estrés de la cirugía. Estos consejos lo ayudarán a manejar las náuseas y a comer alimentos más saludables mientras se recupera. Si seguía un plan alimentario especial antes de la cirugía, pregúntele a beltran proveedor de atención médica si debe continuarlo mientras se recupera. Consulte con beltran proveedor cómo debería continuar beltran alimentación. Esta puede variar según el tipo de cirugía a la que se sometió. Los siguientes consejos generales pueden serle útiles:   No se fuerce a comer. Guíese por beltran cuerpo para saber cuándo comer y qué cantidad.  Comience con líquidos transparentes y sopa. Estos son más fáciles de digerir.  Tan pronto funmilayo se sienta listo, intente comer alimentos semisólidos. Estos incluyen puré de lucía, puré de manzana y gelatina.  Lentamente, pase a alimentos sólidos. Al principio no coma alimentos grasosos, pesados ni condimentados.  No se fuerce a hacer danny comidas grandes al día. En cambio, coma cantidades pequeñas, dain con mayor frecuencia.  [place] los analgésicos con cynthia pequeña cantidad de alimentos sólidos, funmilayo galletas saladas o cynthia tostada. [place] ayuda a prevenir las náuseas.  Cuándo llamar a beltran proveedor de atención médica   Llame de inmediato a beltran proveedor de atención médica si nota alguno de los siguientes síntomas:   Sigue teniendo mucho dolor, o el dolor empeora, después de yoon el medicamento. Puede que el medicamento no sea lo suficientemente susana. O emmie, puede sesar complicaciones de la cirugía.  Se siente demasiado somnoliento, mareado o adormecido. Quizás el medicamento sea demasiado susana.  Tiene efectos secundarios, funmilayo  náuseas o vómitos. Beltran proveedor de atención médica puede recomendarle yoon otros medicamentos.  Tiene cambios en la piel, funmilayo sarpullido, picazón o urticaria. [place] puede significar que tiene cynthia reacción alérgica. Beltran proveedor puede recomendarle yoon otros medicamentos.  La incisión tiene un aspecto diferente (por ejemplo, se abre cynthia parte).  Tiene sangrado o supuración de líquido de la herida y no le dijeron que eso era esperable.  Fiebre de 100.4 °F (38 °C) o más, o según le indique beltran proveedor.  Cuándo llamar al 911  Llame al  911  de inmediato si tiene:   Dificultad para respirar  Marge hinchada    Si tiene apnea del sueño obstructiva   Bernardo la cirugía, le administraron anestesia para que esté cómodo y no sienta dolor. Después de la cirugía, es probable que tenga más ataques de apnea causados por la anestesia y otros medicamentos que le administraron. Los ataques pueden durar más de lo habitual.    En beltran casa, angela lo siguiente:  Cuando duerma, siga usando beltran dispositivo de presión positiva continua en las vías respiratorias (CPAP, por fransisca siglas en inglés). A menos que beltran proveedor de atención médica le indique lo contrario, úselo siempre que duerma, ya sea de día o de noche. El dispositivo de CPAP suele usarse para tratar la apnea obstructiva del sueño.  Consulte a beltran proveedor antes de yoon cualquier analgésico, relajante muscular o sedante. Beltran proveedor le dará información sobre los peligros de yoon estos medicamentos.  Comuníquese con beltran proveedor si tiene el sueño demasiado alterado, incluso cuando esté tomando los medicamentos según las instrucciones.  © 8309-6242 [employer]. Todos los derechos reservados. Esta información no pretende sustituir la atención médica profesional. Sólo beltran médico puede diagnosticar y tratar un problema de rao.

## 2024-07-16 ENCOUNTER — ANESTHESIA EVENT (OUTPATIENT)
Dept: SURGERY | Facility: HOSPITAL | Age: 64
End: 2024-07-16
Payer: OTHER MISCELLANEOUS

## 2024-07-16 ENCOUNTER — HOSPITAL ENCOUNTER (OUTPATIENT)
Facility: HOSPITAL | Age: 64
Setting detail: HOSPITAL OUTPATIENT SURGERY
Discharge: HOME | End: 2024-07-16
Attending: ORTHOPAEDIC SURGERY | Admitting: ORTHOPAEDIC SURGERY
Payer: OTHER MISCELLANEOUS

## 2024-07-16 ENCOUNTER — ANESTHESIA (OUTPATIENT)
Dept: SURGERY | Facility: HOSPITAL | Age: 64
End: 2024-07-16
Payer: OTHER MISCELLANEOUS

## 2024-07-16 VITALS
OXYGEN SATURATION: 100 % | RESPIRATION RATE: 11 BRPM | SYSTOLIC BLOOD PRESSURE: 115 MMHG | DIASTOLIC BLOOD PRESSURE: 64 MMHG | HEIGHT: 66 IN | TEMPERATURE: 98 F | WEIGHT: 154 LBS | BODY MASS INDEX: 24.75 KG/M2 | HEART RATE: 57 BPM

## 2024-07-16 PROCEDURE — 0SBD4ZZ EXCISION OF LEFT KNEE JOINT, PERCUTANEOUS ENDOSCOPIC APPROACH: ICD-10-PCS | Performed by: ORTHOPAEDIC SURGERY

## 2024-07-16 RX ORDER — ACETAMINOPHEN 500 MG
1000 TABLET ORAL ONCE AS NEEDED
Status: DISCONTINUED | OUTPATIENT
Start: 2024-07-16 | End: 2024-07-16

## 2024-07-16 RX ORDER — NALOXONE HYDROCHLORIDE 0.4 MG/ML
80 INJECTION, SOLUTION INTRAMUSCULAR; INTRAVENOUS; SUBCUTANEOUS AS NEEDED
Status: DISCONTINUED | OUTPATIENT
Start: 2024-07-16 | End: 2024-07-16

## 2024-07-16 RX ORDER — HYDROMORPHONE HYDROCHLORIDE 1 MG/ML
0.6 INJECTION, SOLUTION INTRAMUSCULAR; INTRAVENOUS; SUBCUTANEOUS EVERY 5 MIN PRN
Status: DISCONTINUED | OUTPATIENT
Start: 2024-07-16 | End: 2024-07-16

## 2024-07-16 RX ORDER — DEXAMETHASONE SODIUM PHOSPHATE 4 MG/ML
VIAL (ML) INJECTION AS NEEDED
Status: DISCONTINUED | OUTPATIENT
Start: 2024-07-16 | End: 2024-07-16 | Stop reason: SURG

## 2024-07-16 RX ORDER — MORPHINE SULFATE 4 MG/ML
2 INJECTION, SOLUTION INTRAMUSCULAR; INTRAVENOUS EVERY 10 MIN PRN
Status: DISCONTINUED | OUTPATIENT
Start: 2024-07-16 | End: 2024-07-16

## 2024-07-16 RX ORDER — ONDANSETRON 2 MG/ML
INJECTION INTRAMUSCULAR; INTRAVENOUS AS NEEDED
Status: DISCONTINUED | OUTPATIENT
Start: 2024-07-16 | End: 2024-07-16 | Stop reason: SURG

## 2024-07-16 RX ORDER — HYDROCODONE BITARTRATE AND ACETAMINOPHEN 5; 325 MG/1; MG/1
1 TABLET ORAL ONCE AS NEEDED
Status: DISCONTINUED | OUTPATIENT
Start: 2024-07-16 | End: 2024-07-16

## 2024-07-16 RX ORDER — HYDROMORPHONE HYDROCHLORIDE 1 MG/ML
0.2 INJECTION, SOLUTION INTRAMUSCULAR; INTRAVENOUS; SUBCUTANEOUS EVERY 5 MIN PRN
Status: DISCONTINUED | OUTPATIENT
Start: 2024-07-16 | End: 2024-07-16

## 2024-07-16 RX ORDER — LIDOCAINE HYDROCHLORIDE 10 MG/ML
INJECTION, SOLUTION EPIDURAL; INFILTRATION; INTRACAUDAL; PERINEURAL AS NEEDED
Status: DISCONTINUED | OUTPATIENT
Start: 2024-07-16 | End: 2024-07-16 | Stop reason: SURG

## 2024-07-16 RX ORDER — HYDROCODONE BITARTRATE AND ACETAMINOPHEN 5; 325 MG/1; MG/1
2 TABLET ORAL ONCE AS NEEDED
Status: DISCONTINUED | OUTPATIENT
Start: 2024-07-16 | End: 2024-07-16

## 2024-07-16 RX ORDER — MORPHINE SULFATE 4 MG/ML
4 INJECTION, SOLUTION INTRAMUSCULAR; INTRAVENOUS EVERY 10 MIN PRN
Status: DISCONTINUED | OUTPATIENT
Start: 2024-07-16 | End: 2024-07-16

## 2024-07-16 RX ORDER — HYDROMORPHONE HYDROCHLORIDE 1 MG/ML
0.4 INJECTION, SOLUTION INTRAMUSCULAR; INTRAVENOUS; SUBCUTANEOUS EVERY 5 MIN PRN
Status: DISCONTINUED | OUTPATIENT
Start: 2024-07-16 | End: 2024-07-16

## 2024-07-16 RX ORDER — MORPHINE SULFATE 10 MG/ML
6 INJECTION, SOLUTION INTRAMUSCULAR; INTRAVENOUS EVERY 10 MIN PRN
Status: DISCONTINUED | OUTPATIENT
Start: 2024-07-16 | End: 2024-07-16

## 2024-07-16 RX ORDER — BUPIVACAINE HYDROCHLORIDE 2.5 MG/ML
INJECTION, SOLUTION EPIDURAL; INFILTRATION; INTRACAUDAL AS NEEDED
Status: DISCONTINUED | OUTPATIENT
Start: 2024-07-16 | End: 2024-07-16 | Stop reason: HOSPADM

## 2024-07-16 RX ORDER — SODIUM CHLORIDE, SODIUM LACTATE, POTASSIUM CHLORIDE, CALCIUM CHLORIDE 600; 310; 30; 20 MG/100ML; MG/100ML; MG/100ML; MG/100ML
INJECTION, SOLUTION INTRAVENOUS CONTINUOUS
Status: DISCONTINUED | OUTPATIENT
Start: 2024-07-16 | End: 2024-07-16

## 2024-07-16 RX ORDER — OXYCODONE HYDROCHLORIDE AND ACETAMINOPHEN 5; 325 MG/1; MG/1
1 TABLET ORAL ONCE
Status: COMPLETED | OUTPATIENT
Start: 2024-07-16 | End: 2024-07-16

## 2024-07-16 RX ORDER — ACETAMINOPHEN 500 MG
1000 TABLET ORAL ONCE
Status: COMPLETED | OUTPATIENT
Start: 2024-07-16 | End: 2024-07-16

## 2024-07-16 RX ORDER — PROCHLORPERAZINE EDISYLATE 5 MG/ML
5 INJECTION INTRAMUSCULAR; INTRAVENOUS EVERY 8 HOURS PRN
Status: DISCONTINUED | OUTPATIENT
Start: 2024-07-16 | End: 2024-07-16

## 2024-07-16 RX ORDER — ONDANSETRON 2 MG/ML
4 INJECTION INTRAMUSCULAR; INTRAVENOUS EVERY 6 HOURS PRN
Status: DISCONTINUED | OUTPATIENT
Start: 2024-07-16 | End: 2024-07-16

## 2024-07-16 RX ADMIN — DEXAMETHASONE SODIUM PHOSPHATE 8 MG: 4 MG/ML VIAL (ML) INJECTION at 12:55:00

## 2024-07-16 RX ADMIN — LIDOCAINE HYDROCHLORIDE 50 MG: 10 INJECTION, SOLUTION EPIDURAL; INFILTRATION; INTRACAUDAL; PERINEURAL at 12:54:00

## 2024-07-16 RX ADMIN — ONDANSETRON 4 MG: 2 INJECTION INTRAMUSCULAR; INTRAVENOUS at 13:14:00

## 2024-07-16 NOTE — ANESTHESIA POSTPROCEDURE EVALUATION
Patient: Millicent Sarkar    Procedure Summary       Date: 07/16/24 Room / Location: [facility] MAIN OR  / [facility] MAIN OR    Anesthesia Start: 1250 Anesthesia Stop:     Procedure: Left knee arthroscopic partial medial meniscectomy, synovectomy, chondroplasty (Left: Knee) Diagnosis: (Left knee meniscus tear)    Surgeons: Master Antonio MD Anesthesiologist: Christiana Man MD    Anesthesia Type: general ASA Status: 2            Anesthesia Type: No value filed.    Vitals Value Taken Time   /56 07/16/24 1323   Temp 98 07/16/24 1323   Pulse 56 07/16/24 1323   Resp 8 07/16/24 1323   SpO2 95 % 07/16/24 1323   Vitals shown include unfiled device data.    [facility] AN Post Evaluation:   Patient Evaluated in PACU  Patient Participation: complete - patient participated  Level of Consciousness: sleepy but conscious  Pain Score: 0  Pain Management: adequate  Airway Patency:patent  Dental exam unchanged from preop  Yes    Nausea/Vomiting: none  Cardiovascular Status: acceptable  Respiratory Status: acceptable  Postoperative Hydration acceptable      Christiana Man MD  7/16/2024 1:23 PM

## 2024-07-16 NOTE — ANESTHESIA PREPROCEDURE EVALUATION
Anesthesia PreOp Note    HPI:     Millicent Sarkar is a 63 year old female who presents for preoperative consultation requested by: Master Antonio MD    Date of Surgery: 2024    Procedure(s):  Left knee arthroscopic partial medial meniscectomy  Indication: Left knee meniscus tear    Relevant Problems   No relevant active problems       NPO:                         History Review:  Patient Active Problem List    Diagnosis Date Noted    Right cervical radiculopathy 2022    Rotator cuff tear, right 2021    Halitosis 2019    Well woman exam with routine gynecological exam 2016    Nontoxic multinodular goiter 2014    Goiter 2013       Past Medical History:    Cyst, dermoid, arm    per [employer]: removed    H/O breast augmentation    per [employer]:     High cholesterol    Lipid screening    per NG    Osteoarthritis    Visual impairment    wears glasses       Past Surgical History:   Procedure Laterality Date    Appendectomy      Cholecystectomy      Electrocardiogram, complete  2013 08:01:05    scanned to media tab  1959    Hysterectomy      per [employer]:     Implant left      Implant right  2012    Thyroidectomy Left 2014    per [employer]: left hemithyroidectomy       Medications Prior to Admission   Medication Sig Dispense Refill Last Dose    nitroglycerin 0.4 MG Sublingual SL Tab        PREGABALIN 75 MG Oral Cap TAKE 1 CAPSULE BY MOUTH TWICE A DAY 60 capsule 2     atorvastatin 40 MG Oral Tab Take 1 tablet (40 mg total) by mouth nightly.       loratadine 10 MG Oral Tab TAKE 1 TABLET BY MOUTH EVERY NIGHT AT BEDTIME FOR ALLERGIES   More than a month    ergocalciferol 1.25 MG (61775 UT) Oral Cap TAKE 1 CAPSULE BY MOUTH 1 TIME A WEEK AS DIRECTED       Multiple Vitamin (MULTIVITAMIN) Oral Tab Take 1 tablet by mouth daily.       cyclobenzaprine 5 MG Oral Tab Take 1 tablet (5 mg total) by mouth nightly. 30 tablet 1     Meloxicam 15 MG Oral  Tab Take 1 tablet (15 mg total) by mouth daily. 30 tablet 3 More than a month    Cholecalciferol (VITAMIN D3) 1.25 MG (54426 UT) Oral Cap Take 1 capsule by mouth once a week. 12 capsule 0      Current Facility-Administered Medications Ordered in Epic   Medication Dose Route Frequency Provider Last Rate Last Admin    lactated ringers infusion   Intravenous Continuous Master Antonio MD        acetaminophen (Tylenol Extra Strength) tab 1,000 mg  1,000 mg Oral Once Master Antonio MD        ceFAZolin (Ancef) 2g in 10mL IV syringe premix  2 g Intravenous Once Master Antonio MD         No current [facility]-ordered outpatient medications on file.       No Known Allergies    Family History   Problem Relation Age of Onset    Hypertension Father     Heart Disease Father         CAD    Hypertension Other 70    Hypertension Sister      Social History     Socioeconomic History    Marital status:    Tobacco Use    Smoking status: Never    Smokeless tobacco: Never   Vaping Use    Vaping status: Never Used   Substance and Sexual Activity    Alcohol use: Yes     Alcohol/week: 1.0 standard drink of alcohol     Types: 1 Cans of beer per week     Comment: socially    Drug use: No   Other Topics Concern    Caffeine Concern Yes     Comment: coffee, 2-3 cups    Exercise No       Available pre-op labs reviewed.  Lab Results   Component Value Date    WBC 5.1 06/24/2024    RBC 5.22 06/24/2024    HGB 14.4 06/24/2024    HCT 42.5 06/24/2024    MCV 81.4 06/24/2024    MCH 27.6 06/24/2024    MCHC 33.9 06/24/2024    RDW 13.0 06/24/2024    .0 06/24/2024     Lab Results   Component Value Date     06/24/2024    K 4.4 06/24/2024     06/24/2024    CO2 29.0 06/24/2024    BUN 11 06/24/2024    CREATSERUM 0.84 06/24/2024     (H) 06/24/2024    CA 10.2 06/24/2024          Vital Signs:  Body mass index is 25.02 kg/m².   height is 1.676 m (5' 6\") and weight is 70.3 kg (155 lb).   Vitals:    07/06/24 0840    Weight: 70.3 kg (155 lb)   Height: 1.676 m (5' 6\")        Anesthesia Evaluation     Patient summary reviewed and Nursing notes reviewed    Airway   Mallampati: II  TM distance: >3 FB  Neck ROM: full  Dental      Pulmonary     breath sounds clear to auscultation  Cardiovascular - negative ROS    Rhythm: regular    Neuro/Psych    (+)  neuromuscular disease,        GI/Hepatic/Renal - negative ROS     Endo/Other - negative ROS   Abdominal                  Anesthesia Plan:   ASA:  2  Plan:   General  Airway:  ETT  Informed Consent Plan and Risks Discussed With:  Patient  Discussed plan with:  Attending      I have informed Millicent Sarkar and/or legal guardian or family member of the nature of the anesthetic plan, benefits, risks including possible dental damage if relevant, major complications, and any alternative forms of anesthetic management.   All of the patient's questions were answered to the best of my ability. The patient desires the anesthetic management as planned.  Terri Real MD  7/16/2024 11:26 AM  Present on Admission:  **None**

## 2024-07-16 NOTE — H&P
Chief Complaint  Left knee pain    HPI Left knee injury at work  PMH Reviewed  ROS negative except left knee pain    PE   Left knee Shady test    A/P left knee mmt  Left knee mmd

## 2024-07-16 NOTE — ANESTHESIA PROCEDURE NOTES
Airway  Date/Time: 7/16/2024 12:55 PM  Urgency: Elective    Airway not difficult    General Information and Staff    Patient location during procedure: OR  Anesthesiologist: Christiana Man MD  Performed: anesthesiologist   Performed by: Christiana Man MD  Authorized by: Christiana Man MD      Indications and Patient Condition  Indications for airway management: anesthesia  Sedation level: deep  Preoxygenated: yes  Patient position: sniffing  Mask difficulty assessment: 0 - not attempted    Final Airway Details  Final airway type: supraglottic airway      Successful airway: classic  Size 3       Number of attempts at approach: 1

## 2024-07-17 NOTE — OPERATIVE REPORT
[facility]    PATIENT'S NAME: VENKATA TEJEDA   ATTENDING PHYSICIAN: Master Antonio MD   OPERATING PHYSICIAN: Master Antonio MD   PATIENT ACCOUNT#:   [id number]    LOCATION:  [address]  MEDICAL RECORD #:   [id number]       YOB: 1960  ADMISSION DATE:       07/16/2024      OPERATION DATE:  07/16/2024    OPERATIVE REPORT     PREOPERATIVE DIAGNOSIS:  Left knee medial meniscus tear, left knee pain.  POSTOPERATIVE DIAGNOSIS:    1.   Left knee medial meniscus tear.  2.   Left knee extensive multiple compartment synovitis with medial inflamed plica.  3.   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  PROCEDURE:  1.   Left knee partial medial meniscectomy.    2.   Left knee extensive synovectomy with excision of medial plica.  3.   Left knee chondroplasty of the medial aspect of the trochlea.    ASSISTANT SURGEON:  Law Pollock MD.    ANESTHESIA:  General.    COMPLICATIONS:  None.    INDICATIONS:  The patient is a pleasant female who was seen today for surgical intervention of her left knee.  The risks and benefits were discussed.    OPERATIVE TECHNIQUE:  She was met in the preoperative area by myself.  The knee was marked.  She was given appropriate antibiotics.  We did a time-out to confirm that we were doing the correct knee and that her antibiotics were given.  She was intubated and sedated.  The knee was prepped and draped per usual.  A lateral portal was created.  The knee joint revealed synovitis.  She had it throughout the knee joint.  She had a medial inflamed plica.  There was a 1 x 1 cm trochlear chondral defect of grade 2 with some loose cartilaginous flaps.  The lateral compartment was normal.  The ACL and PCL were intact.  The medial compartment revealed a tear in the posterior horn of the medial meniscus.  At this point, a medial port was brought in.  A thorough debridement of the synovium superiorly, medially, laterally and a notch  performed with a high-speed shaver through both the medial and lateral portals.  We excised the medial inflamed plica.  We then did a chondroplasty of the medial trochlea using biting instruments and nicholas.  We resected the flap of cartilage into a smooth stable base.  Next, we addressed the medial meniscus tear using a medial portal, and using biting instruments and nicholas, we  resected about 20% of the posterior horn of the medial meniscus.  At this point, final pictures were obtained.  The patient was taken to the recovery room in excellent condition.      Dr. Pollock was present and available throughout the entire case, assisting with knee manipulation, range of motion, and decreasing operating room time.    Dictated By Master Antonio MD  d: 07/16/2024 13:28:30  t: 07/16/2024 18:25:20  [facility] [id number]  [facility]/

## 2024-07-31 ENCOUNTER — ORDER TRANSCRIPTION (OUTPATIENT)
Dept: PHYSICAL THERAPY | Facility: HOSPITAL | Age: 64
End: 2024-07-31

## 2024-07-31 ENCOUNTER — TELEPHONE (OUTPATIENT)
Dept: PHYSICAL THERAPY | Facility: HOSPITAL | Age: 64
End: 2024-07-31

## 2024-07-31 DIAGNOSIS — Z87.828 S/P ARTHROSCOPIC PARTIAL MEDIAL MENISCECTOMY OF LEFT KNEE: Primary | ICD-10-CM

## 2024-07-31 DIAGNOSIS — Z98.890 S/P ARTHROSCOPIC PARTIAL MEDIAL MENISCECTOMY OF LEFT KNEE: Primary | ICD-10-CM

## 2024-08-21 ENCOUNTER — APPOINTMENT (OUTPATIENT)
Dept: PHYSICAL THERAPY | Facility: HOSPITAL | Age: 64
End: 2024-08-21
Attending: ORTHOPAEDIC SURGERY
Payer: OTHER MISCELLANEOUS

## 2024-08-23 ENCOUNTER — APPOINTMENT (OUTPATIENT)
Dept: PHYSICAL THERAPY | Facility: HOSPITAL | Age: 64
End: 2024-08-23
Attending: ORTHOPAEDIC SURGERY
Payer: OTHER MISCELLANEOUS

## 2024-08-28 ENCOUNTER — APPOINTMENT (OUTPATIENT)
Dept: PHYSICAL THERAPY | Facility: HOSPITAL | Age: 64
End: 2024-08-28
Attending: ORTHOPAEDIC SURGERY
Payer: OTHER MISCELLANEOUS

## 2024-08-30 ENCOUNTER — APPOINTMENT (OUTPATIENT)
Dept: PHYSICAL THERAPY | Facility: HOSPITAL | Age: 64
End: 2024-08-30
Attending: ORTHOPAEDIC SURGERY
Payer: OTHER MISCELLANEOUS

## 2024-09-03 ENCOUNTER — APPOINTMENT (OUTPATIENT)
Dept: PHYSICAL THERAPY | Facility: HOSPITAL | Age: 64
End: 2024-09-03
Attending: ORTHOPAEDIC SURGERY
Payer: OTHER MISCELLANEOUS

## 2024-09-05 ENCOUNTER — APPOINTMENT (OUTPATIENT)
Dept: PHYSICAL THERAPY | Facility: HOSPITAL | Age: 64
End: 2024-09-05
Attending: ORTHOPAEDIC SURGERY
Payer: OTHER MISCELLANEOUS

## 2024-09-10 ENCOUNTER — APPOINTMENT (OUTPATIENT)
Dept: PHYSICAL THERAPY | Facility: HOSPITAL | Age: 64
End: 2024-09-10
Attending: ORTHOPAEDIC SURGERY
Payer: OTHER MISCELLANEOUS

## 2024-09-12 ENCOUNTER — APPOINTMENT (OUTPATIENT)
Dept: PHYSICAL THERAPY | Facility: HOSPITAL | Age: 64
End: 2024-09-12
Attending: ORTHOPAEDIC SURGERY
Payer: OTHER MISCELLANEOUS

## 2024-09-17 ENCOUNTER — OFFICE VISIT (OUTPATIENT)
Dept: OBGYN CLINIC | Facility: CLINIC | Age: 64
End: 2024-09-17

## 2024-09-17 VITALS — BODY MASS INDEX: 25 KG/M2 | WEIGHT: 156 LBS | SYSTOLIC BLOOD PRESSURE: 129 MMHG | DIASTOLIC BLOOD PRESSURE: 73 MMHG

## 2024-09-17 DIAGNOSIS — Z01.419 WELL WOMAN EXAM WITH ROUTINE GYNECOLOGICAL EXAM: ICD-10-CM

## 2024-09-17 DIAGNOSIS — Z12.31 ENCOUNTER FOR SCREENING MAMMOGRAM FOR BREAST CANCER: Primary | ICD-10-CM

## 2024-09-17 DIAGNOSIS — M85.80 OSTEOPENIA, UNSPECIFIED LOCATION: ICD-10-CM

## 2024-09-17 DIAGNOSIS — Z13.21 ENCOUNTER FOR VITAMIN DEFICIENCY SCREENING: ICD-10-CM

## 2024-09-17 NOTE — PROGRESS NOTES
HPI:    Patient ID: Millicent Sarkar is a 64 year old year old female.    HPI  Well woman visit  64-year-old  4 para 2-0-2-2 with history of hysterectomy  History of osteopenia.  Will order bone density test and vitamin D level.  Encourage exercise, weightbearing exercise, calcium and vitamin D.  Last mammogram normal 2021.  Review of Systems     Current Outpatient Medications   Medication Sig Dispense Refill    PREGABALIN 75 MG Oral Cap TAKE 1 CAPSULE BY MOUTH TWICE A DAY 60 capsule 2    atorvastatin 40 MG Oral Tab Take 1 tablet (40 mg total) by mouth nightly.      nitroglycerin 0.4 MG Sublingual SL Tab       loratadine 10 MG Oral Tab TAKE 1 TABLET BY MOUTH EVERY NIGHT AT BEDTIME FOR ALLERGIES      ergocalciferol 1.25 MG (53757 UT) Oral Cap TAKE 1 CAPSULE BY MOUTH 1 TIME A WEEK AS DIRECTED      Multiple Vitamin (MULTIVITAMIN) Oral Tab Take 1 tablet by mouth daily.      cyclobenzaprine 5 MG Oral Tab Take 1 tablet (5 mg total) by mouth nightly. 30 tablet 1    Meloxicam 15 MG Oral Tab Take 1 tablet (15 mg total) by mouth daily. 30 tablet 3    Cholecalciferol (VITAMIN D3) 1.25 MG (73424 UT) Oral Cap Take 1 capsule by mouth once a week. 12 capsule 0       Past Medical History:    Cyst, dermoid, arm    per [facility]: removed    H/O breast augmentation    per [facility]:     High cholesterol    Lipid screening    per NG    Osteoarthritis    Visual impairment    wears glasses       Past Surgical History:   Procedure Laterality Date    Appendectomy      Cholecystectomy      Electrocardiogram, complete  2013 08:01:05    scanned to media tab  1959    Hysterectomy      per [facility]:     Implant left  2012    Implant right  2012    Thyroidectomy Left 2014    per [facility]: left hemithyroidectomy       Family History   Problem Relation Age of Onset    Hypertension Father     Heart Disease Father         CAD    Hypertension Other 70    Hypertension Sister        Social History      Socioeconomic History    Marital status:      Spouse name: Not on file    Number of children: Not on file    Years of education: Not on file    Highest education level: Not on file   Occupational History    Not on file   Tobacco Use    Smoking status: Never    Smokeless tobacco: Never   Vaping Use    Vaping status: Never Used   Substance and Sexual Activity    Alcohol use: Yes     Alcohol/week: 1.0 standard drink of alcohol     Types: 1 Cans of beer per week     Comment: socially    Drug use: No    Sexual activity: Not on file   Other Topics Concern     Service Not Asked    Blood Transfusions Not Asked    Caffeine Concern Yes     Comment: coffee, 2-3 cups    Occupational Exposure Not Asked    Hobby Hazards Not Asked    Sleep Concern Not Asked    Stress Concern Not Asked    Weight Concern Not Asked    Special Diet Not Asked    Back Care Not Asked    Exercise No    Bike Helmet Not Asked    Seat Belt Not Asked    Self-Exams Not Asked   Social History Narrative    The patient does not use an assistive device..      The patient does live in a home with stairs.     Social Determinants of Health     Financial Resource Strain: Not on file   Food Insecurity: Not on file   Transportation Needs: Not on file   Physical Activity: Not on file   Stress: Not on file   Social Connections: Not on file   Housing Stability: Not on file       Physical Exam     Vitals: There were no vitals taken for this visit.    Constitutional: She appears well-developed and well-nourished.     Musculoskeletal: Normal range of motion of upper and lower extremities.   Neurological: She is alert and oriented x 3.   Skin: Skin is warm without pallor.  Psychiatric: Her behavior is normal. Judgment normal.  Able to communicate verbally.    HEENT:  EOMI.  ELLIE.  Sclera anicteric.    Head: Normocephalic.  Normal hair distribution.  No lesions.  Neck: Normal range of motion.      Adenopathy:  No supraclavicular or cervical  adenopathy.  Thyroid:  Normal size, shape, and position.  No masses, tenderness, or nodules.  Cardiovascular: Normal rate and regular rhythm.    Pulmonary/Chest: Effort normal.   Abdominal: Soft. Normal appearance and bowel sounds are normal. She exhibits no mass. There is no hepatosplenomegaly. There is no tenderness. There is no rebound and no CVA tenderness. No hernia. Hernia negative in the ventral area,  negative in the right inguinal area and negative in the left inguinal area.   Lymphadenopathy:        Right: No inguinal adenopathy present.        Left: No inguinal adenopathy present.     Breasts:    Symmetric bilaterally.  Areolas without lesions.  Skin- normal without growths, lesions, erythema or peau d'orange change.  Nipples- without retraction or discharge.  No masses, lumps, skin changes, erythema, or lesions.  Axilla-  No adenopathy, mass, or tenderness.    Genitourinary:   Pelvic exam was performed with patient supine and chaperone present.  External genitalia- normal.  [place]'s and [place]'s glands normal.  Urethral meatus- without lesions, mass, or discharge.  Urethra- normal without lesion, cyst, mass, or tenderness.  Vulva- normal.  Labia majora and minora without lesions.   Vagina- normal, no lesions or discharge.  Moist and well supported.  Bladder-  nontender.  No masses.  Normal support.  No evidence of cystocele,  abnormal bladder neck mobility or evident urinary incontinence.  Cervix and uterus absent adnexa-  Nontender, no masses.   Perineum- normal without lesions  Anus-  Normal appearing without lesions.    ASSESSMENT/PLAN:  Well woman visit  Normal examination.  Maintain healthy lifestyle and habits with balanced diet of fruits, vegetables, nuts, fish, meat, and carbs.  Limit fats and excess carbs.  Exercise regularly; 30 minutes a day at least 5 days a week.  Calcium and vitamin D intake or supplement as needed.  Sunshine 20-30 minutes when able and avoid burning.  Monthly self breast  exams.  Annual mammograms.  Dexascans as indicated.  Cervical cytology until age 64 for low risk patient. N/A  Return to clinic in 1-2 years.    Osteopenia.  See above.    Outpatient Encounter Medications as of 9/17/2024   Medication Sig Dispense Refill    PREGABALIN 75 MG Oral Cap TAKE 1 CAPSULE BY MOUTH TWICE A DAY 60 capsule 2    atorvastatin 40 MG Oral Tab Take 1 tablet (40 mg total) by mouth nightly.      nitroglycerin 0.4 MG Sublingual SL Tab       loratadine 10 MG Oral Tab TAKE 1 TABLET BY MOUTH EVERY NIGHT AT BEDTIME FOR ALLERGIES      ergocalciferol 1.25 MG (96992 UT) Oral Cap TAKE 1 CAPSULE BY MOUTH 1 TIME A WEEK AS DIRECTED      Multiple Vitamin (MULTIVITAMIN) Oral Tab Take 1 tablet by mouth daily.      cyclobenzaprine 5 MG Oral Tab Take 1 tablet (5 mg total) by mouth nightly. 30 tablet 1    Meloxicam 15 MG Oral Tab Take 1 tablet (15 mg total) by mouth daily. 30 tablet 3    Cholecalciferol (VITAMIN D3) 1.25 MG (16038 UT) Oral Cap Take 1 capsule by mouth once a week. 12 capsule 0     No facility-administered encounter medications on file as of 9/17/2024.

## 2024-10-28 ENCOUNTER — HOSPITAL ENCOUNTER (OUTPATIENT)
Dept: BONE DENSITY | Age: 64
Discharge: HOME | End: 2024-10-28
Attending: OBSTETRICS & GYNECOLOGY
Payer: MEDICAID

## 2024-10-28 ENCOUNTER — HOSPITAL ENCOUNTER (OUTPATIENT)
Dept: MAMMOGRAPHY | Age: 64
End: 2024-10-28
Attending: OBSTETRICS & GYNECOLOGY
Payer: MEDICAID

## 2024-10-28 DIAGNOSIS — M85.80 OSTEOPENIA, UNSPECIFIED LOCATION: ICD-10-CM

## 2024-10-28 PROCEDURE — 77080 DXA BONE DENSITY AXIAL: CPT | Performed by: OBSTETRICS & GYNECOLOGY

## 2024-10-29 ENCOUNTER — TELEPHONE (OUTPATIENT)
Dept: OBGYN CLINIC | Facility: CLINIC | Age: 64
End: 2024-10-29

## 2024-10-29 NOTE — TELEPHONE ENCOUNTER
----- Message from Angel Luke sent at 10/29/2024  9:20 AM CDT -----  Please inform patient by MyChart, mail, or call of dexascan result showing osteoporosis, significantly low bone density.  She is at increased risk of fractures.  Patient should take measures to maintain and improve bone density-- weight bearing exercise 30 minutes 4-5 x/week, calcium with vitamin D (1200mg/1,000 IU/day) supplements, and eating calcium and vitamin D rich foods (such as milk, yogurt, cheese, mangoes).  She should go for the ordered Vitamin D level.  She should schedule an appointment to discuss treatment options.

## 2024-10-31 NOTE — TELEPHONE ENCOUNTER
Pt name and  verified     Pt informed of results and recommendations. Pt verbalized understanding and agreed. Pt aware she should get labs completed prior to her appointment. Appointment with Dr. Luke scheduled for . Pt aware of scheduling details.

## 2024-11-04 ENCOUNTER — TELEPHONE (OUTPATIENT)
Dept: OBGYN CLINIC | Facility: CLINIC | Age: 64
End: 2024-11-04

## 2024-11-04 ENCOUNTER — LAB ENCOUNTER (OUTPATIENT)
Dept: LAB | Age: 64
End: 2024-11-04
Attending: OBSTETRICS & GYNECOLOGY
Payer: MEDICAID

## 2024-11-04 DIAGNOSIS — Z13.21 ENCOUNTER FOR VITAMIN DEFICIENCY SCREENING: ICD-10-CM

## 2024-11-04 LAB — VIT D+METAB SERPL-MCNC: 30 NG/ML (ref 30–100)

## 2024-11-04 PROCEDURE — 36415 COLL VENOUS BLD VENIPUNCTURE: CPT

## 2024-11-04 PROCEDURE — 82306 VITAMIN D 25 HYDROXY: CPT

## 2024-11-04 NOTE — TELEPHONE ENCOUNTER
Patient verified name and     Patient informed, voiced understanding and agreed. Requesting to move appointment for earlier in the day. Patient rescheduled.

## 2024-11-05 ENCOUNTER — OFFICE VISIT (OUTPATIENT)
Dept: OBGYN CLINIC | Facility: CLINIC | Age: 64
End: 2024-11-05

## 2024-11-05 VITALS
SYSTOLIC BLOOD PRESSURE: 133 MMHG | WEIGHT: 159 LBS | DIASTOLIC BLOOD PRESSURE: 81 MMHG | HEIGHT: 66 IN | BODY MASS INDEX: 25.55 KG/M2

## 2024-11-05 DIAGNOSIS — M80.00XA OSTEOPOROSIS WITH CURRENT PATHOLOGICAL FRACTURE, UNSPECIFIED OSTEOPOROSIS TYPE, INITIAL ENCOUNTER: Primary | ICD-10-CM

## 2024-11-05 PROCEDURE — 99213 OFFICE O/P EST LOW 20 MIN: CPT | Performed by: OBSTETRICS & GYNECOLOGY

## 2024-11-05 NOTE — PROGRESS NOTES
Subjective:   Patient ID: Millicent Sarkar is a 64 year old female.    HPI  Gyne consult  Dexascan showing osteoporosis hip and spine.  Progressive from 2021 with worsening 8-13%  CONCLUSION:   1. Osteoporosis, which according to World Health Organization criteria places the patient at a severely increased risk for fracture.      2. Based on left femoral neck bone mineral density, the FRAX 10 year probability of a major osteoporotic fracture is 8.2% and the 10 year probability of a hip fracture is 2.0%.      3. Compared to the prior study, bone mineral density has decreased in the lumbar spine and left hip.      Vitamin D level 30.  She used to take vitamin D supplements but not recently.  We discussed weightbearing exercises 30 minutes 5 days a week, calcium and vitamin D, diet.  We discussed medication all treatment for osteoporosis including bisphosphonates such as Fosamax as well as injectable subcu and IV forms of medications.  She has a history of gastritis and is not very inclined to take oral medications.  She has had some cavities that needed feeling but no major dental work.  She is interested in a endocrinology consultation for options of treatment of osteoporosis.      History/Other:   Review of Systems   Constitutional: Negative.    Cardiovascular: Negative.    Gastrointestinal: Negative.    Genitourinary: Negative.    Skin: Negative.    Neurological: Negative.    Psychiatric/Behavioral: Negative.     All other systems reviewed and are negative.    Current Outpatient Medications   Medication Sig Dispense Refill    PREGABALIN 75 MG Oral Cap TAKE 1 CAPSULE BY MOUTH TWICE A DAY 60 capsule 2    atorvastatin 40 MG Oral Tab Take 1 tablet (40 mg total) by mouth nightly. (Patient not taking: Reported on 9/17/2024)      nitroglycerin 0.4 MG Sublingual SL Tab       loratadine 10 MG Oral Tab TAKE 1 TABLET BY MOUTH EVERY NIGHT AT BEDTIME FOR ALLERGIES      ergocalciferol 1.25 MG (87589 UT) Oral Cap TAKE 1  CAPSULE BY MOUTH 1 TIME A WEEK AS DIRECTED      Multiple Vitamin (MULTIVITAMIN) Oral Tab Take 1 tablet by mouth daily.      cyclobenzaprine 5 MG Oral Tab Take 1 tablet (5 mg total) by mouth nightly. 30 tablet 1    Meloxicam 15 MG Oral Tab Take 1 tablet (15 mg total) by mouth daily. (Patient not taking: Reported on 9/17/2024) 30 tablet 3    Cholecalciferol (VITAMIN D3) 1.25 MG (33742 UT) Oral Cap Take 1 capsule by mouth once a week. 12 capsule 0     Allergies:Allergies[1]    Objective:   Physical Exam    Assessment & Plan:   1. Osteoporosis with current pathological fracture, unspecified osteoporosis type, initial encounter    Counseled extensively  Provided with endocrinology information/referral.  Calcium 1200 mg daily.  Vitamin D 2000 international units daily.    Meds This Visit:  Requested Prescriptions      No prescriptions requested or ordered in this encounter       Imaging & Referrals:  None         [1] No Known Allergies

## 2024-12-24 ENCOUNTER — HOSPITAL ENCOUNTER (OUTPATIENT)
Dept: MAMMOGRAPHY | Age: 64
Discharge: HOME | End: 2024-12-24
Attending: OBSTETRICS & GYNECOLOGY
Payer: MEDICARE

## 2024-12-24 DIAGNOSIS — Z12.31 ENCOUNTER FOR SCREENING MAMMOGRAM FOR BREAST CANCER: ICD-10-CM

## 2024-12-24 PROCEDURE — 77063 BREAST TOMOSYNTHESIS BI: CPT | Performed by: OBSTETRICS & GYNECOLOGY

## 2024-12-24 PROCEDURE — 77067 SCR MAMMO BI INCL CAD: CPT | Performed by: OBSTETRICS & GYNECOLOGY

## 2025-03-12 ENCOUNTER — OFFICE VISIT (OUTPATIENT)
Dept: OBGYN CLINIC | Facility: CLINIC | Age: 65
End: 2025-03-12

## 2025-03-12 VITALS
BODY MASS INDEX: 26 KG/M2 | DIASTOLIC BLOOD PRESSURE: 67 MMHG | HEART RATE: 68 BPM | WEIGHT: 159 LBS | SYSTOLIC BLOOD PRESSURE: 126 MMHG

## 2025-03-12 DIAGNOSIS — R10.2 PELVIC PAIN: Primary | ICD-10-CM

## 2025-03-12 DIAGNOSIS — N89.8 VAGINAL ODOR: ICD-10-CM

## 2025-03-12 PROCEDURE — 99213 OFFICE O/P EST LOW 20 MIN: CPT | Performed by: STUDENT IN AN ORGANIZED HEALTH CARE EDUCATION/TRAINING PROGRAM

## 2025-03-12 NOTE — PROGRESS NOTES
[facility]  Obstetrics and Gynecology  Gyne Problem Visit      Millicent Sarkar is a 64 year old female  is a new patient to me presenting today with concerns of pelvic pain and bloating for the last four weeks. States she experiencing she feels \"inflammation\" in area of pelvis. Denies any urinary frequency, hematuria, or dysuria. Pt admits to excessive caffeine intake and little water intake. Pt reports history of constipation and does not have regular BM. Also notes vaginal odor without discharge, itching, or irritation. Not sexually active.     No LMP recorded. Patient has had a hysterectomy.     OBSTETRICS HISTORY:  OB History    Para Term  AB Living   4 2 0 0 2 2   SAB IAB Ectopic Multiple Live Births   0 0 0 0 0       GYNE HISTORY:      No data recorded       No data to display                  History   Sexual Activity    Sexual activity: Not Currently       MEDICAL HISTORY:  Past Medical History:   Diagnosis Date    Cyst, dermoid, arm     per [facility]: removed    H/O breast augmentation     per [facility]:     High cholesterol     Lipid screening 2013    per NG    Osteoarthritis     Visual impairment     wears glasses     Past Surgical History:   Procedure Laterality Date    Appendectomy      Cholecystectomy      Electrocardiogram, complete  2013 08:01:05    scanned to media tab  1959    Hysterectomy      per [facility]:     Implant left      Implant right  2012    Thyroidectomy Left 2014    per [facility]: left hemithyroidectomy       SOCIAL HISTORY:  Social History     Socioeconomic History    Marital status:      Spouse name: Not on file    Number of children: Not on file    Years of education: Not on file    Highest education level: Not on file   Occupational History    Not on file   Tobacco Use    Smoking status: Never    Smokeless tobacco: Never   Vaping Use    Vaping status: Never Used   Substance and Sexual Activity    Alcohol use:  Yes     Alcohol/week: 1.0 standard drink of alcohol     Types: 1 Cans of beer per week     Comment: socially    Drug use: No    Sexual activity: Not Currently   Other Topics Concern     Service Not Asked    Blood Transfusions Not Asked    Caffeine Concern Yes     Comment: coffee, 2-3 cups    Occupational Exposure Not Asked    Hobby Hazards Not Asked    Sleep Concern Not Asked    Stress Concern Not Asked    Weight Concern Not Asked    Special Diet Not Asked    Back Care Not Asked    Exercise No    Bike Helmet Not Asked    Seat Belt Not Asked    Self-Exams Not Asked   Social History Narrative    The patient does not use an assistive device..      The patient does live in a home with stairs.     Social Drivers of Health     Food Insecurity: Not on file   Transportation Needs: Not on file   Stress: Not on file   Housing Stability: Not on file       MEDICATIONS:    Current Outpatient Medications:     PREGABALIN 75 MG Oral Cap, TAKE 1 CAPSULE BY MOUTH TWICE A DAY, Disp: 60 capsule, Rfl: 2    atorvastatin 40 MG Oral Tab, Take 1 tablet (40 mg total) by mouth nightly. (Patient not taking: Reported on 9/17/2024), Disp: , Rfl:     nitroglycerin 0.4 MG Sublingual SL Tab, , Disp: , Rfl:     loratadine 10 MG Oral Tab, TAKE 1 TABLET BY MOUTH EVERY NIGHT AT BEDTIME FOR ALLERGIES, Disp: , Rfl:     ergocalciferol 1.25 MG (74402 UT) Oral Cap, TAKE 1 CAPSULE BY MOUTH 1 TIME A WEEK AS DIRECTED, Disp: , Rfl:     Multiple Vitamin (MULTIVITAMIN) Oral Tab, Take 1 tablet by mouth daily., Disp: , Rfl:     cyclobenzaprine 5 MG Oral Tab, Take 1 tablet (5 mg total) by mouth nightly., Disp: 30 tablet, Rfl: 1    Meloxicam 15 MG Oral Tab, Take 1 tablet (15 mg total) by mouth daily. (Patient not taking: Reported on 9/17/2024), Disp: 30 tablet, Rfl: 3    Cholecalciferol (VITAMIN D3) 1.25 MG (72037 UT) Oral Cap, Take 1 capsule by mouth once a week., Disp: 12 capsule, Rfl: 0    ALLERGIES:  Allergies[1]      REVIEW OF SYSTEMS:  Review of Systems    Constitutional:  Negative for chills, fever and unexpected weight change.   Respiratory: Negative.     Cardiovascular: Negative.    Gastrointestinal:  Negative for abdominal pain, constipation, diarrhea and nausea.   Genitourinary:  Positive for pelvic pain. Negative for dyspareunia, dysuria, genital sores, hematuria, menstrual problem, vaginal bleeding, vaginal discharge and vaginal pain.        Vaginal odor   Musculoskeletal: Negative.    Skin: Negative.    Neurological: Negative.    Hematological: Negative.    Psychiatric/Behavioral: Negative.         PHYSICAL EXAM:  /67   Pulse 68   Wt 159 lb (72.1 kg)   BMI 25.66 kg/m²     GENERAL: well developed, well nourished, in no apparent distress  ABDOMEN: Soft, non distended; non tender, no masses  GYNE/: External Genitalia: Normal appearing, no lesions. Urethral meatus appear wnl, no abnormal discharge or lesions noted.          Bladder: well supported, urethra wnl, no lesions or fissures                     Vagina: pink mucosa, no lesions, normal clear discharge.                      Uterus: surgically absent                     Cervix: surgically absent                     Adnexa: non tender, no masses, normal size     ASSESSMENT:       ICD-10-CM    1. Pelvic pain  R10.2 Urinalysis with Culture Reflex      2. Vaginal odor  N89.8 SURESWAB(R) ADVANCED VAGINITIS PLUS, TMA          Plan:  - Vaginal cultures and urinalysis sent, will treat pending results. Advised she limit her caffeine intake as this can cause bladder irritation. Encouraged stay well-hydrated and to incorporate more fiber in her diet.  - Advised she follow-up with PCP to rule out GI cause.    Requested Prescriptions      No prescriptions requested or ordered in this encounter       ALLY KILGORE PA-C  2:25 PM  3/12/2025        Spent total time 20 minutes on obtaining history / chart review, evaluating patient / performing medically appropriate exam, discussing treatment options,  counseling / educating, and completing documentation, coordinating care.         [1] No Known Allergies

## 2025-03-13 LAB
CANDIDA GLABRATA: NOT DETECTED
CANDIDA SPECIES: NOT DETECTED
CHLAMYDIA TRACHOMATIS$RNA, TMA: NOT DETECTED
NEISSERIA GONORRHOEAE$RNA, TMA: NOT DETECTED
SURESWAB(R) ADV BACTERIAL VAGINOSIS (BV), TMA: NEGATIVE
TRICHOMONAS VAGINALIS (TV): NOT DETECTED

## 2025-05-29 ENCOUNTER — TELEPHONE (OUTPATIENT)
Facility: CLINIC | Age: 65
End: 2025-05-29

## 2025-06-05 ENCOUNTER — TELEPHONE (OUTPATIENT)
Dept: FAMILY MEDICINE CLINIC | Facility: CLINIC | Age: 65
End: 2025-06-05

## 2025-06-16 NOTE — H&P
[facility] - Gastroenterology                                                                                                               Requesting physician or provider: Hernandez Ewing MD    Chief Complaint   Patient presents with    Abdominal Pain     Patient does not have a PCP at this time and stated that she might have had colon surgery when she was younger but she has a bad memory and is not sure when and why.     Karen [id number]  HPI:   Millicent Sarkar is a 64 year old year-old female with history of gastritis, tubular adenoma, goiter, osteopenia. S/p thyroidectomy, hysterectomy, appendectomy, cholecystectomy. Self reported poor historian.   Abdominal inflammation, constipation, straining for her whole life however recently bm seems to be different now.   Pt describes constipation as having a difficult time evacuating however pt states she is able to evacuate completely. Describes stool small amount, pebble shaped. Pt describes feeling like her bowels are knotted up or twisted as a result of stool being stuck.  Symptoms of constipation include bloating, abdominal pain.   Reports taking fiber dust powder and papaya to help with constipation.   Additionally, pt describes increased phlegm in the morning, dry mouth in the morning, increased acid reflux and heart burn at night. Symptoms are worse with dairy products, improved with carbonated beverage.   Denies: melena, hematochezia, hematemesis, abd surgery, loss of appetite, changes in stool or bowel habits, N/V/D, weight gain/loss    Prior endoscopies:  Colonoscopy 2015: small tubular adenoma follow-up colonoscopy in 5     Soc:  -denies smoking  -denies Etoh    Wt Readings from Last 6 Encounters:   06/17/25 159 lb (72.1 kg)   03/12/25 159 lb (72.1 kg)   11/05/24 159 lb (72.1 kg)   09/17/24 156 lb (70.8 kg)   07/16/24 154 lb (69.9 kg)   02/01/23 150 lb (68 kg)        History, Medications, Allergies, ROS:      Past Medical  History:    Cyst, dermoid, arm    per [facility]: removed    H/O breast augmentation    per [facility]:     High cholesterol    Lipid screening    per [facility]    Osteoarthritis    Visual impairment    wears glasses      Past Surgical History:   Procedure Laterality Date    Appendectomy  1983    Cholecystectomy      Electrocardiogram, complete  2013 08:01:05    scanned to media tab  1959    Hysterectomy      per [facility]:     Implant left  2012    Implant right  2012    Thyroidectomy Left 2014    per [facility]: left hemithyroidectomy      Family Hx:   Family History   Problem Relation Age of Onset    Hypertension Father     Heart Disease Father         CAD    Hypertension Sister     Hypertension Other 70    Breast Cancer Neg     Ovarian Cancer Neg     Prostate Cancer Neg     Pancreatic Cancer Neg       Social History:   Social History     Socioeconomic History    Marital status:    Tobacco Use    Smoking status: Never    Smokeless tobacco: Never   Vaping Use    Vaping status: Never Used   Substance and Sexual Activity    Alcohol use: Yes     Alcohol/week: 1.0 standard drink of alcohol     Types: 1 Cans of beer per week     Comment: socially    Drug use: No    Sexual activity: Not Currently   Other Topics Concern    Caffeine Concern Yes     Comment: coffee, 2-3 cups    Exercise No   Social History Narrative    The patient does not use an assistive device..      The patient does live in a home with stairs.        Medications (Active prior to today's visit):  Current Outpatient Medications   Medication Sig Dispense Refill    polyethylene glycol, PEG 3350-KCl-NaBcb-NaCl-NaSulf, 236 g Oral Recon Soln Take 4,000 mL by mouth once for 1 dose. As directed by GI clinic instructions. 4000 mL 0    omeprazole 20 MG Oral Capsule Delayed Release Take 1 capsule (20 mg total) by mouth every morning before breakfast. 90 capsule 3    loratadine 10 MG Oral Tab Take 1 tablet (10 mg total) by mouth daily. 90 tablet  1    polyethylene glycol, PEG 3350, 17 GM/SCOOP Oral Powder Take 17 g by mouth daily. 238 g 0    nitroglycerin 0.4 MG Sublingual SL Tab       ergocalciferol 1.25 MG (95358 UT) Oral Cap TAKE 1 CAPSULE BY MOUTH 1 TIME A WEEK AS DIRECTED      Multiple Vitamin (MULTIVITAMIN) Oral Tab Take 1 tablet by mouth daily.      Cholecalciferol (VITAMIN D3) 1.25 MG (33977 UT) Oral Cap Take 1 capsule by mouth once a week. 12 capsule 0    PREGABALIN 75 MG Oral Cap TAKE 1 CAPSULE BY MOUTH TWICE A DAY (Patient not taking: Reported on 6/17/2025) 60 capsule 2    atorvastatin 40 MG Oral Tab Take 1 tablet (40 mg total) by mouth nightly. (Patient not taking: Reported on 6/17/2025)      loratadine 10 MG Oral Tab TAKE 1 TABLET BY MOUTH EVERY NIGHT AT BEDTIME FOR ALLERGIES (Patient not taking: Reported on 6/17/2025)      cyclobenzaprine 5 MG Oral Tab Take 1 tablet (5 mg total) by mouth nightly. (Patient not taking: Reported on 6/17/2025) 30 tablet 1    Meloxicam 15 MG Oral Tab Take 1 tablet (15 mg total) by mouth daily. (Patient not taking: Reported on 6/17/2025) 30 tablet 3       Allergies:  No Known Allergies    ROS:   CONSTITUTIONAL:  negative for fevers, rigors  EYES:  negative for diplopia   RESPIRATORY:  negative for severe shortness of breath  CARDIOVASCULAR:  negative for crushing sub-sternal chest pain  GASTROINTESTINAL:  see HPI  GENITOURINARY:  negative for dysuria or gross hematuria  INTEGUMENT/BREAST:  SKIN:  negative for jaundice   ALLERGIC/IMMUNOLOGIC:  negative for hay fever  ENDOCRINE:  negative for cold intolerance and heat intolerance  MUSCULOSKELETAL:  negative for joint effusion/severe erythema  BEHAVIOR/PSYCH:  negative for psychotic behavior      PHYSICAL EXAM:   Height 5' 6\" (1.676 m), weight 159 lb (72.1 kg), not currently breastfeeding.    Gen- Patient appears comfortable and in no acute discomfort  HEENT: the sclera appears anicteric, oropharynx clear, mucus membranes appear moist  CV- regular rate and rhythm, the  12/15 Teri0/linh extremities are warm and well perfused   Lung- Moves air well; No labored breathing  Abdomen- soft, non-tender exam in all quadrants without rigidity or guarding, non-distended, no abnormal bowel sounds noted, no masses are palpated  Skin- No jaundice  Ext: no cyanosis, clubbing or edema is evident.   Neuro- Alert and interactive, and gross movements of extremities normal  Psych - appropriate, non-agitated    Labs/Imaging:     Patient's pertinent labs and imaging were reviewed and discussed with patient today.      Lab Results   Component Value Date     (H) 06/24/2024    GLU 97 01/14/2021    GLU 85 07/06/2020     06/24/2024     01/14/2021     07/06/2020    K 4.4 06/24/2024    K 3.9 01/14/2021    K 4.0 07/06/2020     06/24/2024     01/14/2021     07/06/2020    CO2 29.0 06/24/2024    CO2 29.0 01/14/2021    CO2 32.0 07/06/2020    ANIONGAP 7 06/24/2024    ANIONGAP 2 01/14/2021    ANIONGAP 2 07/06/2020    BUN 11 06/24/2024    BUN 17 01/14/2021    BUN 17 07/06/2020    CREATSERUM 0.84 06/24/2024    CREATSERUM 0.93 01/14/2021    CREATSERUM 0.75 07/06/2020    BUNCREA 13.1 06/24/2024    BUNCREA 18.3 01/14/2021    BUNCREA 22.7 (H) 07/06/2020    CA 10.2 06/24/2024    CA 9.4 01/14/2021    CA 9.0 07/06/2020    OSMOCALC 297 (H) 06/24/2024    OSMOCALC 291 01/14/2021    OSMOCALC 289 07/06/2020    EGFRCR 78 06/24/2024    ALT 24 07/06/2020    ALT 16 07/02/2016    ALT 17 05/09/2015    AST 18 07/06/2020    AST 18 07/02/2016    AST 17 05/09/2015    ALKPHO 85 07/06/2020    BILT 0.4 07/06/2020    BILT 0.8 07/02/2016    BILT 1.0 05/09/2015    TP 6.3 (L) 07/06/2020    TP 6.6 07/02/2016    TP 6.5 05/09/2015    ALB 3.8 07/06/2020    ALB 4.3 07/02/2016    ALB 4.4 05/09/2015    GLOBULIN 2.5 (L) 07/06/2020    GLOBULIN 2.3 (L) 07/02/2016    GLOBULIN 2.1 (L) 05/09/2015    ELECTAG 1.5 07/06/2020    FASTING Yes 06/24/2024    FASTING No 01/14/2021    FASTING No 07/06/2020       Lab Results   Component Value  Date    RBC 5.22 06/24/2024    RBC 4.64 01/14/2021    RBC 4.18 07/06/2020    HGB 14.4 06/24/2024    HGB 12.6 01/14/2021    HGB 11.9 (L) 07/06/2020    HCT 42.5 06/24/2024    HCT 38.1 01/14/2021    HCT 35.0 07/06/2020    MCV 81.4 06/24/2024    MCV 82.1 01/14/2021    MCV 83.7 07/06/2020    MCH 27.6 06/24/2024    MCH 27.2 01/14/2021    MCH 28.5 07/06/2020    MCHC 33.9 06/24/2024    MCHC 33.1 01/14/2021    MCHC 34.0 07/06/2020    RDW 13.0 06/24/2024    RDW 13.7 01/14/2021    RDW 13.3 07/06/2020    NEPRELIM 3.01 06/24/2024    NEPRELIM 2.81 01/14/2021    NEPRELIM 3.08 07/06/2020    WBC 5.1 06/24/2024    WBC 5.0 01/14/2021    WBC 5.7 07/06/2020    .0 06/24/2024    .0 01/14/2021    .0 07/06/2020          ASSESSMENT/PLAN:   Millicent Sarkar is a 64 year old year-old female with history of gastritis, tubular adenoma, goiter, osteopenia. S/p thyroidectomy, hysterectomy, appendectomy, cholecystectomy. Self reported poor historian.   Abdominal inflammation, constipation, straining for her whole life however recently bm seems to be different now.   Pt describes constipation as having a difficult time evacuating however pt states she is able to evacuate completely. Describes stool small amount, pebble shaped. Pt describes feeling like her bowels are knotted up or twisted as a result of stool being stuck.  Symptoms of constipation include bloating, abdominal pain.   Reports taking fiber dust powder and papaya to help with constipation.   Additionally, pt describes increased phlegm in the morning, dry mouth in the morning, increased acid reflux and heart burn at night. Symptoms are worse with dairy products, improved with carbonated beverage.   Denies: melena, hematochezia, hematemesis, abd surgery, loss of appetite, changes in stool or bowel habits, N/V/D, weight gain/loss    1. Bloating  - CT ABDOMEN+PELVIS(CONTRAST ONLY)(CPT=74177); Future    2. Lower abdominal pain  - CT ABDOMEN+PELVIS(CONTRAST  ONLY)(CPT=74177); Future    3. Increased sputum production  - Helicobacter Pylori Breath Test, Adult    4. Tubular adenoma    5. Change in bowel movement    6. Gastritis without bleeding, unspecified chronicity, unspecified gastritis type  - Helicobacter Pylori Breath Test, Adult    7. Bad taste in mouth  - Helicobacter Pylori Breath Test, Adult      Recommendations:    # constipation  - increase fluid intake  - increased fiber intake  - trial miralax daily  - light exercise daily    # acid reflux, increased sputum   - h. Pylori test  - avoid spicy, acidic foods  - trial omeprazole 20mg twice daily  - trial loratadine 10mg daily at night    Follow up in 3 months    1. Schedule colonoscopy with [facility] w/ General pool MD [Diagnosis: tubular adenoma, lower abdominal pain, constipation]    2.  bowel prep from pharmacy: Prep: Trilyte Prep        Orders This Visit:  Orders Placed This Encounter   Procedures    Helicobacter Pylori Breath Test, Adult       Meds This Visit:  Requested Prescriptions     Signed Prescriptions Disp Refills    polyethylene glycol, PEG 3350-KCl-NaBcb-NaCl-NaSulf, 236 g Oral Recon Soln 4000 mL 0     Sig: Take 4,000 mL by mouth once for 1 dose. As directed by GI clinic instructions.    omeprazole 20 MG Oral Capsule Delayed Release 90 capsule 3     Sig: Take 1 capsule (20 mg total) by mouth every morning before breakfast.    loratadine 10 MG Oral Tab 90 tablet 1     Sig: Take 1 tablet (10 mg total) by mouth daily.    polyethylene glycol, PEG 3350, 17 GM/SCOOP Oral Powder 238 g 0     Sig: Take 17 g by mouth daily.       Imaging & Referrals:  CT ABDOMEN+PELVIS(CONTRAST ONLY)(CPT=74177)         POONAM Cano   6/17/2025

## 2025-06-17 ENCOUNTER — TELEPHONE (OUTPATIENT)
Facility: CLINIC | Age: 65
End: 2025-06-17

## 2025-06-17 ENCOUNTER — OFFICE VISIT (OUTPATIENT)
Facility: CLINIC | Age: 65
End: 2025-06-17

## 2025-06-17 VITALS — HEIGHT: 66 IN | WEIGHT: 159 LBS | BODY MASS INDEX: 25.55 KG/M2

## 2025-06-17 DIAGNOSIS — R19.8 CHANGE IN BOWEL MOVEMENT: ICD-10-CM

## 2025-06-17 DIAGNOSIS — R14.0 BLOATING: ICD-10-CM

## 2025-06-17 DIAGNOSIS — D36.9 TUBULAR ADENOMA: ICD-10-CM

## 2025-06-17 DIAGNOSIS — K29.70 GASTRITIS WITHOUT BLEEDING, UNSPECIFIED CHRONICITY, UNSPECIFIED GASTRITIS TYPE: ICD-10-CM

## 2025-06-17 DIAGNOSIS — R14.0 BLOATING: Primary | ICD-10-CM

## 2025-06-17 DIAGNOSIS — D36.9 ADENOMATOUS POLYPOSIS: ICD-10-CM

## 2025-06-17 DIAGNOSIS — R10.30 LOWER ABDOMINAL PAIN: ICD-10-CM

## 2025-06-17 DIAGNOSIS — R09.3 INCREASED SPUTUM PRODUCTION: ICD-10-CM

## 2025-06-17 DIAGNOSIS — R43.8 BAD TASTE IN MOUTH: ICD-10-CM

## 2025-06-17 DIAGNOSIS — R10.30 LOWER ABDOMINAL PAIN: Primary | ICD-10-CM

## 2025-06-17 PROCEDURE — 99214 OFFICE O/P EST MOD 30 MIN: CPT

## 2025-06-17 RX ORDER — POLYETHYLENE GLYCOL 3350 17 G/17G
17 POWDER, FOR SOLUTION ORAL DAILY
Qty: 238 G | Refills: 0 | Status: SHIPPED | OUTPATIENT
Start: 2025-06-17

## 2025-06-17 RX ORDER — LORATADINE 10 MG/1
10 TABLET ORAL DAILY
Qty: 90 TABLET | Refills: 1 | Status: SHIPPED | OUTPATIENT
Start: 2025-06-17

## 2025-06-17 RX ORDER — OMEPRAZOLE 20 MG/1
20 CAPSULE, DELAYED RELEASE ORAL
Qty: 90 CAPSULE | Refills: 3 | Status: SHIPPED | OUTPATIENT
Start: 2025-06-17 | End: 2026-06-12

## 2025-06-17 RX ORDER — BISACODYL 5 MG/1
10 TABLET, DELAYED RELEASE ORAL
Qty: 10 TABLET | Refills: 0 | Status: SHIPPED | OUTPATIENT
Start: 2025-06-17

## 2025-06-17 NOTE — PATIENT INSTRUCTIONS
# constipation  - increase fluid intake  - increased fiber intake  - trial miralax daily  - light exercise daily    # acid reflux, increased sputum   - test for h. pylori  - avoid spicy, acidic foods  - trial omeprazole 20mg twice daily  - trial loratadine 10mg daily at night    Follow up in 3 months    1. Schedule colonoscopy with [facility] w/ General pool MD [Diagnosis: tubular adenoma, lower abdominal pain, constipation]    2.  bowel prep from pharmacy: Prep: Trilyte Prep       For cardiology patients and patients on blood thinners:  Please contact your cardiology clinic for clearance to proceed with the endoscopic procedure. If you are on blood thinners, please also confirm with your cardiologic clinic that you are able to hold the blood thinner per our recommendations.\"    BLOOD THINNER ORDERS:  -Hold for 48 hours (Xarelto, Eliquis, Pradaxa, Savaysa)  -Hold for 3 days (Pletal)  -Hold for 5 days (Coumadin, Plavix, Brilinta, Aggrenox)  -Hold for 7 days (Effient)     For endocrinology insulin patients:    Please contact your endocrinology clinic for insulin adjustment orders prior to your endoscopic procedure.    4. Read all bowel prep instructions carefully    5. AVOID seeds, nuts, popcorn, raw fruits and vegetables (cooked is okay) for 5 days before procedure    6.   If you start any NEW medication after your visit today, please notify us. Certain medications will need to be held before the procedure, or the procedure cannot be performed.     >>>Please note: if you were prescribed Suprep for the bowel prep and it is too expensive or not covered by insurance, it is okay to substitute Trilyte (or any similar generic prep). This can be done by notifying the pharmacy or calling our office.     ENDOSCOPIC RISK BENEFIT DISCUSSION: I described the procedure in great detail with the patient. I discussed the risks and benefits, including but not limited to: bleeding, perforation, infection, anesthesia complications, and  even death. Patient will be NPO after midnight and will have a person physically present at time of pick-up to drive patient home. Patient verbalized understanding and agrees to proceed with procedure as planned.     Tips to Control Acid Reflux    To control acid reflux, you’ll need to make some basic diet and lifestyle changes. The simple steps outlined below may be all you’ll need to ease discomfort.   Watch what you eat  Don't have fatty foods or spicy foods.  Eat fewer acidic foods, such as citrus and tomato-based foods. These can increase symptoms.  Limit drinking alcohol, caffeine, and fizzy beverages. All increase acid reflux.  Try limiting chocolate, peppermint, and spearmint. These can make acid reflux worse in some people.     Watch when you eat  Don't lie down for 3 hours after eating.  Don't snack before going to bed.     Raise your head  Raising your head and upper body by 4 to 6 inches helps limit reflux when you’re lying down. Put blocks under the head of your bed frame or a wedge under your mattress to raise it.   Other changes  Lose weight, if you need to  Don’t exercise near bedtime  Don't wear tight-fitting clothes  Limit aspirin and ibuprofen  Stop smoking     [employer] last reviewed this educational content on 6/1/2019  © 3786-3947 [employer] [address], [place] [address]. All rights reserved. This information is not intended as a substitute for professional medical care. Always follow your healthcare professional's instructions.

## 2025-06-17 NOTE — TELEPHONE ENCOUNTER
Scheduled for: Colonoscopy 03746    Provider Name: Dr. Benitez    Date: Fri 6/20/2025   Location: [facility]   Sedation: MAC    Time: 1:15 pm, (pt is aware that [facility] will call the day before to confirm arrival time)  Prep: split trilyte    Meds/Allergies Reconciled?: NKDA   Diagnosis with codes: tubular adenoma D36.9, lower abdominal pain R10.30, constipation K59.00   Was patient informed to call insurance with codes (Y/N): Yes   Referral sent?: Yes, [employer] [facility] or [facility] notified?: Electronic case request was sent to [facility] via [employer].     Medication Orders: Patient is aware to NOT take iron pills, herbal meds and diet supplements for 7 days before exam. Also to NOT take any form of alcohol, recreational drugs and any forms of ED meds 24-72 hours before exam.      Misc Orders:       Further instructions given by staff: Instructions given in office in Qatari and pt verbalized understanding

## 2025-06-17 NOTE — TELEPHONE ENCOUNTER
Schedulers, see providers orders below:     -Patient was seen in office today with Rica and was provided with written Niuean and verbal procedure prep instructions, including any medication adjustments.   -Patient was advised to call medical insurance for any questions on benefits and/or any out of pocket costs.   -Patient is aware that the GI schedulers will be calling to schedule procedure(s).        1. Schedule colonoscopy with [facility] w/ General pool MD [Diagnosis: tubular adenoma, lower abdominal pain, constipation]     2.  bowel prep from pharmacy: Prep: Trilyte Prep        For cardiology patients and patients on blood thinners:  Please contact your cardiology clinic for clearance to proceed with the endoscopic procedure. If you are on blood thinners, please also confirm with your cardiologic clinic that you are able to hold the blood thinner per our recommendations.\"     BLOOD THINNER ORDERS:  -Hold for 48 hours (Xarelto, Eliquis, Pradaxa, Savaysa)  -Hold for 3 days (Pletal)  -Hold for 5 days (Coumadin, Plavix, Brilinta, Aggrenox)  -Hold for 7 days (Effient)      For endocrinology insulin patients:     Please contact your endocrinology clinic for insulin adjustment orders prior to your endoscopic procedure.     4. Read all bowel prep instructions carefully     5. AVOID seeds, nuts, popcorn, raw fruits and vegetables (cooked is okay) for 5 days before procedure     6.   If you start any NEW medication after your visit today, please notify us. Certain medications will need to be held before the procedure, or the procedure cannot be performed.

## 2025-06-18 ENCOUNTER — LAB ENCOUNTER (OUTPATIENT)
Dept: LAB | Facility: HOSPITAL | Age: 65
End: 2025-06-18
Payer: MEDICARE

## 2025-06-18 ENCOUNTER — TELEPHONE (OUTPATIENT)
Facility: CLINIC | Age: 65
End: 2025-06-18

## 2025-06-18 DIAGNOSIS — K59.00 CONSTIPATION, UNSPECIFIED CONSTIPATION TYPE: ICD-10-CM

## 2025-06-18 DIAGNOSIS — D36.9 TUBULAR ADENOMA: Primary | ICD-10-CM

## 2025-06-18 DIAGNOSIS — K20.90 GASTROESOPHAGITIS: ICD-10-CM

## 2025-06-18 DIAGNOSIS — R43.8 PRIMARY TASTE DISORDER: ICD-10-CM

## 2025-06-18 DIAGNOSIS — R09.3 ABNORMAL SPUTUM: Primary | ICD-10-CM

## 2025-06-18 DIAGNOSIS — K29.70 GASTROESOPHAGITIS: ICD-10-CM

## 2025-06-18 DIAGNOSIS — R10.30 LOWER ABDOMINAL PAIN: ICD-10-CM

## 2025-06-18 PROCEDURE — 83013 H PYLORI (C-13) BREATH: CPT

## 2025-06-18 NOTE — TELEPHONE ENCOUNTER
Kathy received a call and was told that patient's insurance is not contracted with the facility colonoscopy was scheduled at.  Kathy calling to reschedule for different location.  Please call - requesting a call tomorrow, 6/19/2025  as she is heading into work.  Please call.  Thank you.

## 2025-06-19 LAB — H PYLORI BREATH TEST: NEGATIVE

## 2025-06-19 NOTE — TELEPHONE ENCOUNTER
Scheduled for: Colonoscopy 12841  -patient's daughter is aware she needs a referral from PCP prior to procedure  Provider Name: Dr. Otto  Date: 10/7/2025  Location: [facility]   Sedation: MAC    Time: 1:55 pm, (pt is aware that EM will call the day before to confirm arrival time)  Prep: split trilyte    Meds/Allergies Reconciled?: NKDA   Diagnosis with codes: tubular adenoma D36.9, lower abdominal pain R10.30, constipation K59.00   Was patient informed to call insurance with codes (Y/N): Yes   Referral sent?: Yes, [employer] Medicare [facility] or [facility] notified?: Electronic case request was sent to [facility] via [email or web address].     Medication Orders: Patient is aware to NOT take iron pills, herbal meds and diet supplements for 7 days before exam. Also to NOT take any form of alcohol, recreational drugs and any forms of ED meds 24-72 hours before exam.      Misc Orders:       Further instructions given by staff: Instructions given in office in Faroese and pt verbalized understanding

## 2025-07-09 ENCOUNTER — TELEPHONE (OUTPATIENT)
Facility: CLINIC | Age: 65
End: 2025-07-09

## 2025-07-09 DIAGNOSIS — R09.3 INCREASED SPUTUM PRODUCTION: ICD-10-CM

## 2025-07-09 DIAGNOSIS — K14.8 ENLARGED TONGUE: Primary | ICD-10-CM

## 2025-07-09 NOTE — TELEPHONE ENCOUNTER
Rica    Please advise on results-she had H Pylori testing done after her office visit with you.    Thank you

## 2025-07-11 NOTE — TELEPHONE ENCOUNTER
Rica VARMA-patient complaining of bitter taste in mouth, phlegm in throat, and her tongue feels large (she was breathing normally on the phone and speaking clearly) sounds like more of a sensation.  She didn't try the omeprazole or loratadine because the pharmacy didn't give them to her.  I let her know that these were prescribed to help with the symptoms she is describing.  She is going to  the omeprazole from [facility] and the loratadine over the counter since not covered and see if that helps.  I told her I would update you.    Thank you      I spoke to the patient using  Bonifacio ID [id number]  I reviewed below result message from Rica with her.  She said that she does not believe it is negative because she is still having symptoms.    She reports bitter taste in mouth, a lot of phlegm in the throat in the morning, and her tongue feels large.    She has not taken the omeprazole or loratadine because [facility] only gave her the colonoscopy prep.     I tried to submit a prior approval request, but he omeprazole does not require a prior approval.  I called [facility] and was told that the omeprazole went through but the loratadine is not covered because it is an over the counter medication so she will have to fill it over the counter.    I called the patient back using  Vicenta ID [id number]  I let her know that omeprazole is covered and [facility] is filling it for her  I let her know she will need to buy the loratadine over the counter.  She told me that she wants the omeprazole sent to [facility] in [place] instead so I canceled the prescription to [facility] and changed it to [facility].  Patient was thankful for the call.

## 2025-07-11 NOTE — TELEPHONE ENCOUNTER
Christiana,  I agree with your suggestions. Pt to obtain loratadine over the counter and trial omeprazole.   I have also placed a referral for ENT for second opinion.  Thank you,  Rica LEVIN

## 2025-07-18 ENCOUNTER — TELEPHONE (OUTPATIENT)
Facility: CLINIC | Age: 65
End: 2025-07-18

## 2025-07-18 NOTE — TELEPHONE ENCOUNTER
1st,overdue reminder letter mailed out to patient    Imaging order   Orders Placed on 6/17/2025    CT ABDOMEN+PELVIS(CONTRAST ONLY)(CPT=74177)

## (undated) DEVICE — 60 ML SYRINGE LUER-LOCK TIP: Brand: MONOJECT

## (undated) DEVICE — EXPRESSEW III SUTURE NEEDLE FOR USE WITH EXPRESSEW II OR III SUTURE PASSER: Brand: EXPRESSEW

## (undated) DEVICE — SOLUTION IRRIG 3000ML 0.9% NACL FLX CONT

## (undated) DEVICE — CANNULA 5.75 CLEAR AR-6560

## (undated) DEVICE — AMBIENT SUPER TURBOVAC 90 IFS: Brand: COBLATION

## (undated) DEVICE — GAMMEX® PI HYBRID SIZE 7, STERILE POWDER-FREE SURGICAL GLOVE, POLYISOPRENE AND NEOPRENE BLEND: Brand: GAMMEX

## (undated) DEVICE — SUCTION CANISTER, 3000CC,SAFELINER: Brand: DEROYAL

## (undated) DEVICE — SPECIALTY ARM SLING: Brand: DEROYAL

## (undated) DEVICE — SUTURE ETHILON 4-0 662G

## (undated) DEVICE — CANNULA 8.5MM TWIST AR-6530

## (undated) DEVICE — BLADE SHVR COOLCUT 13CM 4MM

## (undated) DEVICE — BLADE SHV L13CM DIA4MM EXCALIBUR AGG COOLCUT

## (undated) DEVICE — ENCORE® LATEX MICRO SIZE 7.5, STERILE LATEX POWDER-FREE SURGICAL GLOVE: Brand: ENCORE

## (undated) DEVICE — ENCORE® LATEX MICRO SIZE 8.5, STERILE LATEX POWDER-FREE SURGICAL GLOVE: Brand: ENCORE

## (undated) DEVICE — 3M™ MICROFOAM™ TAPE 1528-4: Brand: 3M™ MICROFOAM™

## (undated) DEVICE — SUT ETHLN 4-0 18IN FS-2 NABSRB BLK 19MM 3/8 C

## (undated) DEVICE — PAD,ABDOMINAL,8"X7.5",STERILE,LF,1/PK: Brand: MEDLINE

## (undated) DEVICE — APPLICATOR PREP 26ML CHG 2% ISO ALC 70%

## (undated) DEVICE — GAMMEX® PI HYBRID SIZE 8.5, STERILE POWDER-FREE SURGICAL GLOVE, POLYISOPRENE AND NEOPRENE BLEND: Brand: GAMMEX

## (undated) DEVICE — ENCORE® LATEX MICRO SIZE 8, STERILE LATEX POWDER-FREE SURGICAL GLOVE: Brand: ENCORE

## (undated) DEVICE — SOLUTION IRRIG 1000ML 0.9% NACL USP BTL

## (undated) DEVICE — ENCORE® LATEX ACCLAIM SIZE 7.5, STERILE LATEX POWDER-FREE SURGICAL GLOVE: Brand: ENCORE

## (undated) DEVICE — TUBING PMP L16FT DISP

## (undated) DEVICE — GAMMEX® PI HYBRID SIZE 6.5, STERILE POWDER-FREE SURGICAL GLOVE, POLYISOPRENE AND NEOPRENE BLEND: Brand: GAMMEX

## (undated) DEVICE — 3M™ STERI-DRAPE™ U-DRAPE 1015: Brand: STERI-DRAPE™

## (undated) DEVICE — SLEEVE CMPR VLCR STRL

## (undated) DEVICE — MEDI-VAC NON-CONDUCTIVE SUCTION TUBING: Brand: CARDINAL HEALTH

## (undated) DEVICE — SOL  .9 3000ML

## (undated) DEVICE — GAMMEX® PI HYBRID SIZE 7.5, STERILE POWDER-FREE SURGICAL GLOVE, POLYISOPRENE AND NEOPRENE BLEND: Brand: GAMMEX

## (undated) DEVICE — TUBING IRR 16FT CNT WV 3 ASCP

## (undated) DEVICE — SPONGE GZ 4X4IN COT 12 PLY TYP VII WVN C

## (undated) DEVICE — ARTHROSCOPY: Brand: MEDLINE INDUSTRIES, INC.

## (undated) DEVICE — BURR SHVR COOLCUT 13CM 4MM 8

## (undated) DEVICE — SHOULDER ARTHROSCOPY: Brand: MEDLINE INDUSTRIES, INC.

## (undated) DEVICE — BLADE SHVR 13CM 4MM EXCLBR

## (undated) DEVICE — MEDI-VAC YANKAUER SUCTION HANDLE W/BULBOUS TIP: Brand: CARDINAL HEALTH

## (undated) NOTE — LETTER
Date & Time: 4/28/2018, 8:18 PM  Patient: Hayden Morrow  Encounter Provider(s):    Gildardo Bose MD       To Whom It May Concern:    Hayden Morrow was seen and treated in our department on 4/28/2018.  She can return to work with these limitations: marcia

## (undated) NOTE — LETTER
7/30/2020              Eddye Asp        [address] [address]         Dear Katia Terrazas,    This letter is to inform you that our office has made several attempts to reach you by phone without success.   We were attempting to contact you by

## (undated) NOTE — LETTER
5/2/2019              Lupe Sandoval        [address] [address]         Dear Chloe Gibbons,    This letter is to inform you that our office has made several attempts to reach you by phone without success.   We were attempting to contact you by p

## (undated) NOTE — LETTER
6/24/2020    Tamie Fernando        [address] [id number]            Dear Tamie Fernando,      Our records indicate that you are due for a repeat colonoscopy at this time.     Please call our office at [phone] so we can review some questio

## (undated) NOTE — ED AVS SNAPSHOT
Duane Lovett   MRN: [id number]    Department:  [facility] Emergency Department   Date of Visit:  4/28/2018           Disclosure     Insurance plans vary and the physician(s) referred by the ER may not be covered by your plan.  Please contact you CARE PHYSICIAN AT ONCE OR RETURN IMMEDIATELY TO THE EMERGENCY DEPARTMENT. If you have been prescribed any medication(s), please fill your prescription right away and begin taking the medication(s) as directed.   If you believe that any of the medications

## (undated) NOTE — LETTER
7/18/2025          Millicent Sarkar    [address] [address]         Dear Millicent,    Our records indicate that the tests ordered for you by POONAM Cano  have not been done.  If you have, in fact, already completed the tests or you do not wish to have the tests done, please contact our office at THE NUMBER LISTED BELOW.  Otherwise, please proceed with the testing.  Enclosed is a duplicate order for your convenience.  Orders Placed on 6/17/2025  CT ABDOMEN+PELVIS(CONTRAST ONLY)(CPT=74177)    To schedule a test at any [facility], call Central Scheduling at [phone], Monday through Friday between 7:30am to 6pm and on Saturday between 8am and 1pm.   Evening and weekend appointments for your exam are available.Please schedule your test order through my Chart as for your preference.  Por favor llame a Central scheduling al [phone] para programar esta orden de prueba o por favor,programe beltran pedido de prueba a traves de mi cuadro sarah beltran preferencia      Sincerely,    POONAM Cano  [facility]  [address]  [facility] [address] [phone]